# Patient Record
Sex: MALE | Race: WHITE | NOT HISPANIC OR LATINO | Employment: OTHER | URBAN - METROPOLITAN AREA
[De-identification: names, ages, dates, MRNs, and addresses within clinical notes are randomized per-mention and may not be internally consistent; named-entity substitution may affect disease eponyms.]

---

## 2017-01-25 ENCOUNTER — OFFICE VISIT (OUTPATIENT)
Dept: FAMILY MEDICINE CLINIC | Facility: CLINIC | Age: 27
End: 2017-01-25

## 2017-01-25 VITALS
SYSTOLIC BLOOD PRESSURE: 122 MMHG | OXYGEN SATURATION: 95 % | DIASTOLIC BLOOD PRESSURE: 88 MMHG | WEIGHT: 238 LBS | BODY MASS INDEX: 29.59 KG/M2 | HEIGHT: 75 IN | HEART RATE: 81 BPM

## 2017-01-25 DIAGNOSIS — H60.543 DERMATITIS OF BOTH EAR CANALS: ICD-10-CM

## 2017-01-25 DIAGNOSIS — J30.1 SEASONAL ALLERGIC RHINITIS DUE TO POLLEN: ICD-10-CM

## 2017-01-25 DIAGNOSIS — E55.9 VITAMIN D DEFICIENCY: ICD-10-CM

## 2017-01-25 DIAGNOSIS — L30.9 ECZEMA, UNSPECIFIED TYPE: Primary | ICD-10-CM

## 2017-01-25 DIAGNOSIS — F84.0 AUTISM: ICD-10-CM

## 2017-01-25 DIAGNOSIS — K64.0 FIRST DEGREE HEMORRHOIDS: ICD-10-CM

## 2017-01-25 LAB
25(OH)D3 SERPL-MCNC: 30.3 NG/ML
ALBUMIN SERPL-MCNC: 4.5 G/DL (ref 3.2–4.8)
ALBUMIN/GLOB SERPL: 1.7 G/DL (ref 1.5–2.5)
ALP SERPL-CCNC: 94 U/L (ref 25–100)
ALT SERPL W P-5'-P-CCNC: 25 U/L (ref 7–40)
ANION GAP SERPL CALCULATED.3IONS-SCNC: 7 MMOL/L (ref 3–11)
AST SERPL-CCNC: 21 U/L (ref 0–33)
BASOPHILS # BLD AUTO: 0.05 10*3/MM3 (ref 0–0.2)
BASOPHILS NFR BLD AUTO: 0.6 % (ref 0–1)
BILIRUB SERPL-MCNC: 0.4 MG/DL (ref 0.3–1.2)
BUN BLD-MCNC: 16 MG/DL (ref 9–23)
BUN/CREAT SERPL: 22.9 (ref 7–25)
CALCIUM SPEC-SCNC: 9.7 MG/DL (ref 8.7–10.4)
CHLORIDE SERPL-SCNC: 107 MMOL/L (ref 99–109)
CO2 SERPL-SCNC: 27 MMOL/L (ref 20–31)
CREAT BLD-MCNC: 0.7 MG/DL (ref 0.6–1.3)
DEPRECATED RDW RBC AUTO: 41.2 FL (ref 37–54)
EOSINOPHIL # BLD AUTO: 0.25 10*3/MM3 (ref 0.1–0.3)
EOSINOPHIL NFR BLD AUTO: 3.2 % (ref 0–3)
ERYTHROCYTE [DISTWIDTH] IN BLOOD BY AUTOMATED COUNT: 12.9 % (ref 11.3–14.5)
GFR SERPL CREATININE-BSD FRML MDRD: 139 ML/MIN/1.73
GLOBULIN UR ELPH-MCNC: 2.7 GM/DL
GLUCOSE BLD-MCNC: 93 MG/DL (ref 70–100)
HBA1C MFR BLD: 5.1 % (ref 4.8–5.6)
HCT VFR BLD AUTO: 44 % (ref 38.9–50.9)
HGB BLD-MCNC: 14.7 G/DL (ref 13.1–17.5)
IMM GRANULOCYTES # BLD: 0.04 10*3/MM3 (ref 0–0.03)
IMM GRANULOCYTES NFR BLD: 0.5 % (ref 0–0.6)
LYMPHOCYTES # BLD AUTO: 2.26 10*3/MM3 (ref 0.6–4.8)
LYMPHOCYTES NFR BLD AUTO: 29.1 % (ref 24–44)
MCH RBC QN AUTO: 29 PG (ref 27–31)
MCHC RBC AUTO-ENTMCNC: 33.4 G/DL (ref 32–36)
MCV RBC AUTO: 86.8 FL (ref 80–99)
MONOCYTES # BLD AUTO: 0.55 10*3/MM3 (ref 0–1)
MONOCYTES NFR BLD AUTO: 7.1 % (ref 0–12)
NEUTROPHILS # BLD AUTO: 4.62 10*3/MM3 (ref 1.5–8.3)
NEUTROPHILS NFR BLD AUTO: 59.5 % (ref 41–71)
PLATELET # BLD AUTO: 233 10*3/MM3 (ref 150–450)
PMV BLD AUTO: 11.1 FL (ref 6–12)
POTASSIUM BLD-SCNC: 4.3 MMOL/L (ref 3.5–5.5)
PROT SERPL-MCNC: 7.2 G/DL (ref 5.7–8.2)
RBC # BLD AUTO: 5.07 10*6/MM3 (ref 4.2–5.76)
SODIUM BLD-SCNC: 141 MMOL/L (ref 132–146)
TSH SERPL DL<=0.05 MIU/L-ACNC: 1.59 MIU/ML (ref 0.35–5.35)
WBC NRBC COR # BLD: 7.77 10*3/MM3 (ref 3.5–10.8)

## 2017-01-25 PROCEDURE — 99204 OFFICE O/P NEW MOD 45 MIN: CPT | Performed by: FAMILY MEDICINE

## 2017-01-25 PROCEDURE — 36415 COLL VENOUS BLD VENIPUNCTURE: CPT | Performed by: FAMILY MEDICINE

## 2017-01-25 PROCEDURE — 83036 HEMOGLOBIN GLYCOSYLATED A1C: CPT | Performed by: FAMILY MEDICINE

## 2017-01-25 PROCEDURE — 84443 ASSAY THYROID STIM HORMONE: CPT | Performed by: FAMILY MEDICINE

## 2017-01-25 PROCEDURE — 82306 VITAMIN D 25 HYDROXY: CPT | Performed by: FAMILY MEDICINE

## 2017-01-25 PROCEDURE — 85025 COMPLETE CBC W/AUTO DIFF WBC: CPT | Performed by: FAMILY MEDICINE

## 2017-01-25 PROCEDURE — 80053 COMPREHEN METABOLIC PANEL: CPT | Performed by: FAMILY MEDICINE

## 2017-01-25 RX ORDER — TRIAMCINOLONE ACETONIDE 1 MG/G
CREAM TOPICAL 2 TIMES DAILY
Qty: 453.6 G | Refills: 2 | Status: SHIPPED | OUTPATIENT
Start: 2017-01-25

## 2017-01-25 RX ORDER — ASCORBIC ACID 500 MG
1000 TABLET ORAL DAILY
COMMUNITY

## 2017-01-25 RX ORDER — CHOLECALCIFEROL (VITAMIN D3) 25 MCG
TABLET,CHEWABLE ORAL
COMMUNITY
End: 2020-02-04

## 2017-01-25 RX ORDER — FLUTICASONE PROPIONATE 50 MCG
2 SPRAY, SUSPENSION (ML) NASAL DAILY
Qty: 1 EACH | Refills: 6 | Status: SHIPPED | OUTPATIENT
Start: 2017-01-25 | End: 2018-02-09 | Stop reason: SDUPTHER

## 2017-01-25 RX ORDER — MOMETASONE FUROATE 1 MG/G
OINTMENT TOPICAL DAILY
COMMUNITY
End: 2017-01-25 | Stop reason: SDUPTHER

## 2017-01-25 RX ORDER — MOMETASONE FUROATE 1 MG/G
OINTMENT TOPICAL DAILY
Qty: 15 G | Refills: 3 | Status: SHIPPED | OUTPATIENT
Start: 2017-01-25 | End: 2018-12-07 | Stop reason: SDUPTHER

## 2017-01-25 RX ORDER — FLUTICASONE PROPIONATE 50 MCG
2 SPRAY, SUSPENSION (ML) NASAL DAILY
COMMUNITY
End: 2017-01-25 | Stop reason: SDUPTHER

## 2017-01-25 RX ORDER — TRIAMCINOLONE ACETONIDE 1 MG/G
CREAM TOPICAL 2 TIMES DAILY
COMMUNITY
End: 2017-01-25 | Stop reason: SDUPTHER

## 2017-01-25 RX ORDER — MELATONIN
2000 DAILY
COMMUNITY

## 2017-01-25 NOTE — PROGRESS NOTES
"Subjective   Julius Romero is a 26 y.o. male. Est care    History of Present Illness Prior pcp rudy patino with .  Last visit November.      Pt is present with mother today. Pt is autistic and mother is power of . PMH rectal bleeding due to hemorrhoids eval with c-scope at  spring 2016.    He also has hx of eczema and psoriasis.  He has been evaluated by derm in past.       He has hx with wax in ears.      He has been eval with [facility] due to intoeing of feet with ambulation.      Mother requests referral for eye exam due to holding left hand over left eye and pushing his eye to focus. On occasion he gets HA that is alleviated with tylenol or ibu.      Pt mother would like to discuss pt having vit d levels checked \"he takes vitamin D and can get toxic\"  Diagnosed with deficiency 5 years ago.  1000 IU daily.  He was seeing dr Evans for a while.  Last Level 36.  Mother reports she has been checked 2 times a year.       Mother would like to discuss the RX pt has and wants to know if doctor can write those or be sent off to dermatology. Dry skin on right lateral ankle and to lateral calf.  It does itch.  Comes and goes.  Uses dove soap.     Pt has sore on lower right leg that has been presnt about 2 days that comes and goes, mother was told it was eczema and would like it looked at.    The following portions of the patient's history were reviewed and updated as appropriate: allergies, current medications, past family history, past medical history, past social history, past surgical history and problem list.    Review of Systems   Constitutional: Negative.    HENT: Negative.    Eyes: Negative.    Respiratory: Negative.    Cardiovascular: Negative.    Gastrointestinal: Negative.    Endocrine: Negative.    Genitourinary: Negative.    Musculoskeletal: Negative.    Skin: Positive for rash.   Neurological: Negative.    Hematological: Negative.        Objective     Vitals:    01/25/17 1437   BP: 122/88 " "  Pulse: 81   SpO2: 95%   Weight: 238 lb (108 kg)   Height: 75\" (190.5 cm)       Physical Exam   Constitutional: He is oriented to person, place, and time. He appears well-developed and well-nourished.   HENT:   Head: Normocephalic and atraumatic.   Right Ear: External ear normal.   Left Ear: External ear normal.   Nose: Nose normal.   Mouth/Throat: Oropharynx is clear and moist.   Eyes: Conjunctivae and EOM are normal. Pupils are equal, round, and reactive to light. Right eye exhibits no discharge. Left eye exhibits no discharge. No scleral icterus.   Neck: Normal range of motion. Neck supple. No JVD present. No tracheal deviation present. No thyromegaly present.   Cardiovascular: Normal rate, regular rhythm and normal heart sounds.  Exam reveals no gallop and no friction rub.    No murmur heard.  Pulmonary/Chest: Effort normal and breath sounds normal. No respiratory distress. He has no wheezes. He has no rales. He exhibits no tenderness.   Abdominal: Soft. Bowel sounds are normal. He exhibits no distension and no mass. There is no tenderness.   Musculoskeletal: Normal range of motion. He exhibits no edema.   Neurological: He is alert and oriented to person, place, and time. He has normal reflexes. He displays normal reflexes. No cranial nerve deficit. Coordination normal.   Skin: Skin is warm and dry.   Psychiatric: He has a normal mood and affect. His behavior is normal. Judgment and thought content normal.   Nursing note and vitals reviewed.      Assessment/Plan     Problem List Items Addressed This Visit        Cardiovascular and Mediastinum    First degree hemorrhoids       Digestive    Vitamin D deficiency    Relevant Orders    Vitamin D 25 Hydroxy       Nervous and Auditory    Dermatitis of both ear canals    Relevant Medications    mometasone (ELOCON) 0.1 % ointment    triamcinolone (KENALOG) 0.1 % cream       Musculoskeletal and Integument    Eczema - Primary    Relevant Medications    mometasone (ELOCON) " 0.1 % ointment    triamcinolone (KENALOG) 0.1 % cream    Other Relevant Orders    CBC & Differential    TSH    Comprehensive Metabolic Panel    Hemoglobin A1c       Other    Autism    Seasonal allergic rhinitis due to pollen        Will send for a copy of prior medical records.  Patient will sign a release as He is leaving the office today.  I will review those upon receipt.

## 2017-01-25 NOTE — MR AVS SNAPSHOT
Julius Romero   1/25/2017 2:30 PM   Office Visit    Provider:  Lizz Kumar MD   Department:  [facility]   Dept Phone:  [phone]                Your Full Care Plan              Your Updated Medication List          This list is accurate as of: 1/25/17  3:31 PM.  Always use your most recent med list.                acetaminophen 100 MG/ML solution   Commonly known as:  TYLENOL       B-12 1000 MCG capsule       cholecalciferol 1000 UNITS tablet   Commonly known as:  VITAMIN D3       Fish Oil oil       fluticasone 50 MCG/ACT nasal spray   Commonly known as:  FLONASE       mometasone 0.1 % ointment   Commonly known as:  ELOCON       MULTI VITAMIN DAILY PO       PRO-BIOTIC BLEND PO       PROCTOSOL HC 2.5 % rectal cream   Generic drug:  hydrocortisone       psyllium 58.6 % packet   Commonly known as:  METAMUCIL       SUPER B-COMPLEX/VIT C/FA PO       triamcinolone 0.1 % cream   Commonly known as:  KENALOG       vitamin C 500 MG tablet   Commonly known as:  ASCORBIC ACID               We Performed the Following     CBC & Differential     CBC Auto Differential     Comprehensive Metabolic Panel     Hemoglobin A1c     TSH     Vitamin D 25 Hydroxy       You Were Diagnosed With        Codes Comments    Eczema, unspecified type    -  Primary ICD-10-CM: L30.9  ICD-9-CM: 692.9     Vitamin D deficiency     ICD-10-CM: E55.9  ICD-9-CM: 268.9     Autism     ICD-10-CM: F84.0  ICD-9-CM: 299.00     Seasonal allergic rhinitis due to pollen     ICD-10-CM: J30.1  ICD-9-CM: 477.0     First degree hemorrhoids     ICD-10-CM: K64.0  ICD-9-CM: 455.6     Dermatitis of both ear canals     ICD-10-CM: H60.543  ICD-9-CM: 380.22       Instructions     None    Patient Instructions History      MyChart Signup     [facility] [employer] allows you to send messages to your doctor, view your test results, renew your prescriptions, schedule appointments, and more. To sign up, go to  "[employer] and click on the Sign Up Now link in the New User? box. Enter your [employer] Activation Code exactly as it appears below along with the last four digits of your Social Security Number and your Date of Birth () to complete the sign-up process. If you do not sign up before the expiration date, you must request a new code.    [employer] Activation Code: [email or web address]  Expires: 2017  3:30 PM    If you have questions, you can email [email or web address] or call [phone] to talk to our [employer] staff. Remember, [employer] is NOT to be used for urgent needs. For medical emergencies, dial 911.               Other Info from Your Visit           Your Appointments     2017  2:30 PM EDT   Follow Up with Lizz Kumar MD   [facility] (--)    [address] [place] [address] [address]  [facility] [address]   [phone]           Arrive 15 minutes prior to appointment.              Allergies     No Known Allergies      Vital Signs     Blood Pressure Pulse Height Weight Oxygen Saturation Body Mass Index    122/88 81 75\" (190.5 cm) 238 lb (108 kg) 95% 29.75 kg/m2    Smoking Status                   Never Smoker           Problems and Diagnoses Noted     Autism    Dermatitis of both ear canals    Eczema    First degree hemorrhoids    Seasonal allergic rhinitis due to pollen    Vitamin D deficiency      Results       "

## 2017-08-08 ENCOUNTER — OFFICE VISIT (OUTPATIENT)
Dept: FAMILY MEDICINE CLINIC | Facility: CLINIC | Age: 27
End: 2017-08-08

## 2017-08-08 VITALS
HEART RATE: 81 BPM | HEIGHT: 75 IN | SYSTOLIC BLOOD PRESSURE: 122 MMHG | WEIGHT: 233 LBS | BODY MASS INDEX: 28.97 KG/M2 | DIASTOLIC BLOOD PRESSURE: 78 MMHG | OXYGEN SATURATION: 95 %

## 2017-08-08 DIAGNOSIS — J30.1 SEASONAL ALLERGIC RHINITIS DUE TO POLLEN: ICD-10-CM

## 2017-08-08 DIAGNOSIS — R06.83 SNORING: Primary | ICD-10-CM

## 2017-08-08 DIAGNOSIS — R40.0 DAYTIME SOMNOLENCE: ICD-10-CM

## 2017-08-08 PROCEDURE — 99214 OFFICE O/P EST MOD 30 MIN: CPT | Performed by: FAMILY MEDICINE

## 2017-08-08 NOTE — PATIENT INSTRUCTIONS
Tdap Vaccine (Tetanus, Diphtheria and Pertussis): What You Need to Know  1. Why get vaccinated?  Tetanus, diphtheria and pertussis are very serious diseases. Tdap vaccine can protect us from these diseases. And, Tdap vaccine given to pregnant women can protect  babies against pertussis.  TETANUS (Lockjaw) is rare in the [place] today. It causes painful muscle tightening and stiffness, usually all over the body.  · It can lead to tightening of muscles in the head and neck so you can't open your mouth, swallow, or sometimes even breathe. Tetanus kills about 1 out of 10 people who are infected even after receiving the best medical care.  DIPHTHERIA is also rare in the [place] today. It can cause a thick coating to form in the back of the throat.  · It can lead to breathing problems, heart failure, paralysis, and death.  PERTUSSIS (Whooping Cough) causes severe coughing spells, which can cause difficulty breathing, vomiting and disturbed sleep.  · It can also lead to weight loss, incontinence, and rib fractures. Up to 2 in 100 adolescents and 5 in 100 adults with pertussis are hospitalized or have complications, which could include pneumonia or death.  These diseases are caused by bacteria. Diphtheria and pertussis are spread from person to person through secretions from coughing or sneezing. Tetanus enters the body through cuts, scratches, or wounds.  Before vaccines, as many as 200,000 cases of diphtheria, 200,000 cases of pertussis, and hundreds of cases of tetanus, were reported in the [place] each year. Since vaccination began, reports of cases for tetanus and diphtheria have dropped by about 99% and for pertussis by about 80%.  2. Tdap vaccine  Tdap vaccine can protect adolescents and adults from tetanus, diphtheria, and pertussis. One dose of Tdap is routinely given at age 11 or 12. People who did not get Tdap at that age should get it as soon as possible.  Tdap is especially important  for healthcare professionals and anyone having close contact with a baby younger than 12 months.  Pregnant women should get a dose of Tdap during every pregnancy, to protect the  from pertussis. Infants are most at risk for severe, life-threatening complications from pertussis.  Another vaccine, called Td, protects against tetanus and diphtheria, but not pertussis. A Td booster should be given every 10 years. Tdap may be given as one of these boosters if you have never gotten Tdap before. Tdap may also be given after a severe cut or burn to prevent tetanus infection.  Your doctor or the person giving you the vaccine can give you more information.  Tdap may safely be given at the same time as other vaccines.  3. Some people should not get this vaccine  · A person who has ever had a life-threatening allergic reaction after a previous dose of any diphtheria, tetanus or pertussis containing vaccine, OR has a severe allergy to any part of this vaccine, should not get Tdap vaccine. Tell the person giving the vaccine about any severe allergies.  · Anyone who had coma or long repeated seizures within 7 days after a childhood dose of DTP or DTaP, or a previous dose of Tdap, should not get Tdap, unless a cause other than the vaccine was found. They can still get Td.  · Talk to your doctor if you:    have seizures or another nervous system problem,    had severe pain or swelling after any vaccine containing diphtheria, tetanus or pertussis,    ever had a condition called Guillain-Barré Syndrome (GBS),    aren't feeling well on the day the shot is scheduled.  4. Risks  With any medicine, including vaccines, there is a chance of side effects. These are usually mild and go away on their own. Serious reactions are also possible but are rare.  Most people who get Tdap vaccine do not have any problems with it.  Mild problems following Tdap  (Did not interfere with activities)  · Pain where the shot was given (about 3 in 4  adolescents or 2 in 3 adults)  · Redness or swelling where the shot was given (about 1 person in 5)  · Mild fever of at least 100.4°F (up to about 1 in 25 adolescents or 1 in 100 adults)  · Headache (about 3 or 4 people in 10)  · Tiredness (about 1 person in 3 or 4)  · Nausea, vomiting, diarrhea, stomach ache (up to 1 in 4 adolescents or 1 in 10 adults)  · Chills, sore joints (about 1 person in 10)  · Body aches (about 1 person in 3 or 4)  · Rash, swollen glands (uncommon)  Moderate problems following Tdap  (Interfered with activities, but did not require medical attention)  · Pain where the shot was given (up to 1 in 5 or 6)  · Redness or swelling where the shot was given (up to about 1 in 16 adolescents or 1 in 12 adults)  · Fever over 102°F (about 1 in 100 adolescents or 1 in 250 adults)  · Headache (about 1 in 7 adolescents or 1 in 10 adults)  · Nausea, vomiting, diarrhea, stomach ache (up to 1 or 3 people in 100)  · Swelling of the entire arm where the shot was given (up to about 1 in 500).  Severe problems following Tdap  (Unable to perform usual activities; required medical attention)  · Swelling, severe pain, bleeding and redness in the arm where the shot was given (rare).  Problems that could happen after any vaccine:  · People sometimes faint after a medical procedure, including vaccination. Sitting or lying down for about 15 minutes can help prevent fainting, and injuries caused by a fall. Tell your doctor if you feel dizzy, or have vision changes or ringing in the ears.  · Some people get severe pain in the shoulder and have difficulty moving the arm where a shot was given. This happens very rarely.  · Any medication can cause a severe allergic reaction. Such reactions from a vaccine are very rare, estimated at fewer than 1 in a million doses, and would happen within a few minutes to a few hours after the vaccination.  As with any medicine, there is a very remote chance of a vaccine causing a serious  injury or death.  The safety of vaccines is always being monitored. For more information, visit: www.cdc.gov/vaccinesafety/  5. What if there is a serious problem?  What should I look for?  · Look for anything that concerns you, such as signs of a severe allergic reaction, very high fever, or unusual behavior.  · Signs of a severe allergic reaction can include hives, swelling of the face and throat, difficulty breathing, a fast heartbeat, dizziness, and weakness. These would usually start a few minutes to a few hours after the vaccination.  What should I do?  · If you think it is a severe allergic reaction or other emergency that can't wait, call 9-1-1 or get the person to the nearest hospital. Otherwise, call your doctor.  · Afterward, the reaction should be reported to the Vaccine Adverse Event Reporting System (VAERS). Your doctor might file this report, or you can do it yourself through the VAERS web site at www.vaers.hhs.gov, or by calling 1-680.840.1811.  VAERS does not give medical advice.   6. The National Vaccine Injury Compensation Program  The National Vaccine Injury Compensation Program (VICP) is a federal program that was created to compensate people who may have been injured by certain vaccines.  Persons who believe they may have been injured by a vaccine can learn about the program and about filing a claim by calling 1-247.497.4018 or visiting the VICP website at www.hrsa.gov/vaccinecompensation. There is a time limit to file a claim for compensation.  7. How can I learn more?  · Ask your doctor. He or she can give you the vaccine package insert or suggest other sources of information.  · Call your local or state health department.  · Contact the Centers for Disease Control and Prevention (CDC):    Call 1-383.453.7519 (6-408-YVW-INFO) or    Visit CDC's website at www.cdc.gov/vaccines  CDC Tdap Vaccine VIS (2/24/15)     This information is not intended to replace advice given to you by your health care  provider. Make sure you discuss any questions you have with your health care provider.     Document Released: 06/18/2013 Document Revised: 01/08/2016 Document Reviewed: 04/01/2015  Elsevier Interactive Patient Education ©2017 Elsevier Inc.

## 2017-08-08 NOTE — PROGRESS NOTES
Subjective   Julius Romero is a 27 y.o. male.  Pt is here for 6 month fu. Pt is present with mother today. Mom is concerned about pt complaining of having headaches     History of Present Illness He is here for 6 month follow up.  Continues to walk at mall with father daily.      Mother expresses multiple concerns today.  She is worried about his height and finger length.  She is also concerned that many children with autism have cardiac defects and is interested in having a cardiac work up.  He had echo at one point for workup for marfan's syndrome.  That workup in past was negative.      In past he has had headaches and was seen by neurology.  She reports an MRI was not ordered.  Headaches 2 times weekly in mornings worse.  No focal neurologic changes no numbness or tingling headaches are transient and not persistent.    He does snore at night on occasion and seems more tired in afternoons.  The mother reports that he has had some postnasal drainage and allergy type symptoms in the past few days.  He has not had a fever.  She reports that he's been propping himself up on a pillow at night but no shortness of breath otherwise.  She is concerned he may have a sore throat and would like me to look in his ears because sometimes he gets wax build up in his ears.    I have reviewed previous records from 2016 from .  He was seen and evaluated in past by colorectal at .       The following portions of the patient's history were reviewed and updated as appropriate: allergies, current medications, past family history, past medical history, past social history, past surgical history and problem list.    Review of Systems   Constitutional: Negative for chills, fatigue, fever and unexpected weight change.   HENT: Negative for congestion, ear pain, nosebleeds, rhinorrhea, sinus pressure, sneezing, sore throat and trouble swallowing.    Eyes: Negative for itching and visual disturbance.   Respiratory: Negative for cough,  "chest tightness, shortness of breath and wheezing.    Cardiovascular: Negative for chest pain, palpitations and leg swelling.   Gastrointestinal: Negative for abdominal pain, anal bleeding, blood in stool, constipation, diarrhea, nausea and vomiting.   Endocrine: Negative for cold intolerance, heat intolerance, polydipsia and polyuria.   Genitourinary: Negative for difficulty urinating, frequency, hematuria and urgency.   Musculoskeletal: Negative for back pain, gait problem and myalgias.   Skin: Negative for rash and wound.   Neurological: Negative for dizziness, weakness, numbness and headaches.   Hematological: Negative for adenopathy. Does not bruise/bleed easily.   Psychiatric/Behavioral: Negative for agitation, confusion, decreased concentration and suicidal ideas. The patient is not nervous/anxious.        Objective     Vitals:    08/08/17 1526   BP: 122/78   Pulse: 81   SpO2: 95%   Weight: 233 lb (106 kg)   Height: 75\" (190.5 cm)       Physical Exam   Constitutional: He is oriented to person, place, and time. He appears well-developed and well-nourished.   HENT:   Head: Normocephalic and atraumatic.   Right Ear: External ear normal.   Left Ear: External ear normal.   Nose: Nose normal.   Mouth/Throat: Oropharynx is clear and moist. No oropharyngeal exudate.   Eyes: Conjunctivae and EOM are normal. Pupils are equal, round, and reactive to light. Right eye exhibits no discharge. Left eye exhibits no discharge. No scleral icterus.   Neck: Normal range of motion. Neck supple. No JVD present. No tracheal deviation present. No thyromegaly present.   Cardiovascular: Normal rate, regular rhythm and normal heart sounds.  Exam reveals no gallop and no friction rub.    No murmur heard.  Pulmonary/Chest: Effort normal and breath sounds normal. No respiratory distress. He has no wheezes. He has no rales. He exhibits no tenderness.   Abdominal: Soft. Bowel sounds are normal. He exhibits no distension and no mass. There is " no tenderness.   Musculoskeletal: Normal range of motion. He exhibits no edema.   Neurological: He is alert and oriented to person, place, and time. He has normal reflexes. He displays normal reflexes. No cranial nerve deficit. Coordination normal.   Skin: Skin is warm and dry.   Psychiatric: He has a normal mood and affect. His behavior is normal. Judgment and thought content normal.   Nursing note and vitals reviewed.      Assessment/Plan     Problem List Items Addressed This Visit        Respiratory    Snoring - Primary    Relevant Orders    Ambulatory Referral to Sleep Medicine       Other    Seasonal allergic rhinitis due to pollen    Daytime somnolence      Start treatment for seasonal allergies with Allegra or Claritin.    If he tolerates we could try Flonase.  We will go ahead and refer for a sleep study with sleep disorders evaluation to make sure he doesn't have some sleep apnea contributing to both his daytime somnolence and his headaches.    I've reassured and discussed with the mother that he does not appear to have a marfanoid stature.  He has had an echo in the past for evaluation and workup.    Recommended a T dap today the mother would like to make sure that there are no additives that could contribute to be associated with his autism.  I've given her information on the tetanus vaccine.

## 2017-12-15 ENCOUNTER — CONSULT (OUTPATIENT)
Dept: SLEEP MEDICINE | Facility: HOSPITAL | Age: 27
End: 2017-12-15

## 2017-12-15 VITALS
HEART RATE: 69 BPM | OXYGEN SATURATION: 98 % | DIASTOLIC BLOOD PRESSURE: 68 MMHG | SYSTOLIC BLOOD PRESSURE: 126 MMHG | HEIGHT: 75 IN | BODY MASS INDEX: 27.48 KG/M2 | WEIGHT: 221 LBS

## 2017-12-15 DIAGNOSIS — E66.3 OVERWEIGHT: ICD-10-CM

## 2017-12-15 DIAGNOSIS — G47.33 OBSTRUCTIVE SLEEP APNEA, ADULT: ICD-10-CM

## 2017-12-15 DIAGNOSIS — R06.83 SNORING: Primary | ICD-10-CM

## 2017-12-15 PROCEDURE — 99203 OFFICE O/P NEW LOW 30 MIN: CPT | Performed by: INTERNAL MEDICINE

## 2017-12-15 NOTE — PATIENT INSTRUCTIONS
Sleep Apnea  Sleep apnea is a condition in which breathing pauses or becomes shallow during sleep. Episodes of sleep apnea usually last 10 seconds or longer, and they may occur as many as 20 times an hour. Sleep apnea disrupts your sleep and keeps your body from getting the rest that it needs. This condition can increase your risk of certain health problems, including:  · Heart attack.  · Stroke.  · Obesity.  · Diabetes.  · Heart failure.  · Irregular heartbeat.  There are three kinds of sleep apnea:  · Obstructive sleep apnea. This kind is caused by a blocked or collapsed airway.  · Central sleep apnea. This kind happens when the part of the brain that controls breathing does not send the correct signals to the muscles that control breathing.  · Mixed sleep apnea. This is a combination of obstructive and central sleep apnea.  CAUSES  The most common cause of this condition is a collapsed or blocked airway. An airway can collapse or become blocked if:  · Your throat muscles are abnormally relaxed.  · Your tongue and tonsils are larger than normal.  · You are overweight.  · Your airway is smaller than normal.  RISK FACTORS  This condition is more likely to develop in people who:  · Are overweight.  · Smoke.  · Have a smaller than normal airway.  · Are elderly.  · Are male.  · Drink alcohol.  · Take sedatives or tranquilizers.  · Have a family history of sleep apnea.  SYMPTOMS  Symptoms of this condition include:  · Trouble staying asleep.  · Daytime sleepiness and tiredness.  · Irritability.  · Loud snoring.  · Morning headaches.  · Trouble concentrating.  · Forgetfulness.  · Decreased interest in sex.  · Unexplained sleepiness.  · Mood swings.  · Personality changes.  · Feelings of depression.  · Waking up often during the night to urinate.  · Dry mouth.  · Sore throat.  DIAGNOSIS  This condition may be diagnosed with:  · A medical history.  · A physical exam.  · A series of tests that are done while you are  sleeping (sleep study). These tests are usually done in a sleep lab, but they may also be done at home.  TREATMENT  Treatment for this condition aims to restore normal breathing and to ease symptoms during sleep. It may involve managing health issues that can affect breathing, such as high blood pressure or obesity. Treatment may include:  · Sleeping on your side.  · Using a decongestant if you have nasal congestion.  · Avoiding the use of depressants, including alcohol, sedatives, and narcotics.  · Losing weight if you are overweight.  · Making changes to your diet.  · Quitting smoking.  · Using a device to open your airway while you sleep, such as:    An oral appliance. This is a custom-made mouthpiece that shifts your lower jaw forward.    A continuous positive airway pressure (CPAP) device. This device delivers oxygen to your airway through a mask.    A nasal expiratory positive airway pressure (EPAP) device. This device has valves that you put into each nostril.    A bi-level positive airway pressure (BPAP) device. This device delivers oxygen to your airway through a mask.  · Surgery if other treatments do not work. During surgery, excess tissue is removed to create a wider airway.  It is important to get treatment for sleep apnea. Without treatment, this condition can lead to:  · High blood pressure.  · Coronary artery disease.  · (Men) An inability to achieve or maintain an erection (impotence).  · Reduced thinking abilities.  HOME CARE INSTRUCTIONS  · Make any lifestyle changes that your health care provider recommends.  · Eat a healthy, well-balanced diet.  · Take over-the-counter and prescription medicines only as told by your health care provider.  · Avoid using depressants, including alcohol, sedatives, and narcotics.  · Take steps to lose weight if you are overweight.  · If you were given a device to open your airway while you sleep, use it only as told by your health care provider.  · Do not use any  tobacco products, such as cigarettes, chewing tobacco, and e-cigarettes. If you need help quitting, ask your health care provider.  · Keep all follow-up visits as told by your health care provider. This is important.  SEEK MEDICAL CARE IF:  · The device that you received to open your airway during sleep is uncomfortable or does not seem to be working.  · Your symptoms do not improve.  · Your symptoms get worse.  SEEK IMMEDIATE MEDICAL CARE IF:  · You develop chest pain.  · You develop shortness of breath.  · You develop discomfort in your back, arms, or stomach.  · You have trouble speaking.  · You have weakness on one side of your body.  · You have drooping in your face.  These symptoms may represent a serious problem that is an emergency. Do not wait to see if the symptoms will go away. Get medical help right away. Call your local emergency services (911 in the [place] Do not drive yourself to the hospital.     This information is not intended to replace advice given to you by your health care provider. Make sure you discuss any questions you have with your health care provider.     Document Released: 12/08/2003 Document Revised: 04/10/2017 Document Reviewed: 09/26/2016  [employer] Interactive Patient Education ©2017 [employer]

## 2017-12-17 NOTE — PROGRESS NOTES
Subjective   Julius Romero is a 27 y.o. male is being seen for consultation today at the request of Lizz Kumar MD for the evaluation of nonrestorative sleep.    History of Present Illness  Patient has significant autism and the history is obtained from his mother.  He apparently awakens complaining of headache couple of days per week.  He falls asleep quickly in the evenings.  He is not been noted to have snoring are noted gasping for breath.  He has no history of breaking his nose.  He does have trouble breathing through his nose both day and night.    There is no history of reflux symptoms and no history of hypnagogic hallucinations sleep paralysis or cataplexy.  There is no history of kicking or jerking his legs at night.    He goes to bed mean 9:10 PM.  He will fall asleep fairly quickly.  He does not usually awaken during the night.  He gets about 9 hours of sleep apparently feels fairly rested.  There is no history of hypertension diabetes or coronary artery disease.  No Known Allergies he does have environmental allergies.      Current Outpatient Prescriptions:   •  acetaminophen (TYLENOL) 100 MG/ML solution, Take 10 mg/kg by mouth Every 4 (Four) Hours As Needed for fever., Disp: , Rfl:   •  B Complex-C-Folic Acid (SUPER B-COMPLEX/VIT C/FA PO), Take  by mouth., Disp: , Rfl:   •  cholecalciferol (VITAMIN D3) 1000 UNITS tablet, Take 1,000 Units by mouth Daily., Disp: , Rfl:   •  Cyanocobalamin (B-12) 1000 MCG capsule, Take  by mouth., Disp: , Rfl:   •  docusate sodium (COLACE) 50 MG capsule, Take  by mouth 2 (Two) Times a Day As Needed for Constipation., Disp: , Rfl:   •  Fish Oil oil, , Disp: , Rfl:   •  fluticasone (FLONASE) 50 MCG/ACT nasal spray, 2 sprays into each nostril Daily., Disp: 1 each, Rfl: 6  •  hydrocortisone (PROCTOSOL HC) 2.5 % rectal cream, Insert  into the rectum 2 (Two) Times a Day., Disp: 28.35 g, Rfl: 3  •  mometasone (ELOCON) 0.1 % ointment, Apply  topically Daily., Disp: 15 g,  Rfl: 3  •  Multiple Vitamin (MULTI VITAMIN DAILY PO), Take  by mouth., Disp: , Rfl:   •  Probiotic Product (PRO-BIOTIC BLEND PO), Take  by mouth., Disp: , Rfl:   •  psyllium (METAMUCIL) 58.6 % packet, Take 1 packet by mouth Daily., Disp: , Rfl:   •  triamcinolone (KENALOG) 0.1 % cream, Apply  topically 2 (Two) Times a Day., Disp: 453.6 g, Rfl: 2  •  vitamin C (ASCORBIC ACID) 500 MG tablet, Take 1,000 mg by mouth Daily., Disp: , Rfl:     Smoking status: Never Smoker                                                              Smokeless status: Never Used                           History   Alcohol Use No       Caffeine: He has one serving of tea per day    Past Medical History:   Diagnosis Date   • Autism    • Constipation    • Eczema    • Excess ear wax    • Visual impairment    • Vitamin D deficiency        Past Surgical History:   Procedure Laterality Date   • COLONOSCOPY     • MOUTH SURGERY         Family History   Problem Relation Age of Onset   • Diabetes Mother    • Hypertension Mother    • Vision loss Mother    • Hyperlipidemia Mother    • Arthritis Father    • Depression Father    • Parkinsonism Father    • Hyperlipidemia Father        The following portions of the patient's history were reviewed and updated as appropriate: allergies, current medications, past family history, past medical history, past social history, past surgical history and problem list.    Review of Systems   Constitutional: Negative.    HENT: Positive for postnasal drip and sinus pressure.    Eyes: Negative.    Respiratory: Negative.    Cardiovascular: Negative.    Gastrointestinal: Positive for blood in stool.   Endocrine: Negative.    Genitourinary: Negative.    Musculoskeletal: Positive for gait problem.   Skin: Positive for rash.   Allergic/Immunologic: Positive for environmental allergies.   Neurological: Positive for headaches.   Hematological: Negative.    Psychiatric/Behavioral: Negative.         He has significant autism     "Posey scores 3/24    Objective     /68  Pulse 69  Ht 190.5 cm (75\")  Wt 100 kg (221 lb)  SpO2 98%  BMI 27.62 kg/m2     Physical Exam   Constitutional: He is oriented to person, place, and time. He appears well-developed and well-nourished.   He is overweight.   HENT:   Head: Normocephalic and atraumatic.   He has Mallampati class four anatomy   Eyes: EOM are normal. Pupils are equal, round, and reactive to light.   Neck: Normal range of motion. Neck supple.   Cardiovascular: Normal rate, regular rhythm and normal heart sounds.    Pulmonary/Chest: Effort normal and breath sounds normal.   Abdominal: Soft. Bowel sounds are normal.   Musculoskeletal: Normal range of motion. He exhibits no edema.   Neurological: He is alert and oriented to person, place, and time.   Skin: Skin is warm and dry.   Psychiatric: He has a normal mood and affect. His behavior is normal.         Assessment/Plan   Julius was seen today for sleeping problem.    Diagnoses and all orders for this visit:    Snoring  -     Home Sleep Study; Future    Obstructive sleep apnea, adult  -     Home Sleep Study; Future    Overweight     patient's mother gives a history of nonrestorative sleep.  He is rather sleepy during the day.  He is unsure if his awakening at night.  We will plan to proceed to home sleep testing.  I've discussed possible therapies for sleep apnea including CPAP, weight control, oral appliances, and surgery.  He is to return in roughly 2 weeks after his  study and we'll reassess situation         Saurabh Tsang MD [facility]  Sleep Medicine  Pulmonary and Critical Care Medicine    "

## 2017-12-18 ENCOUNTER — HOSPITAL ENCOUNTER (OUTPATIENT)
Dept: SLEEP MEDICINE | Facility: HOSPITAL | Age: 27
Discharge: HOME | End: 2017-12-18
Attending: INTERNAL MEDICINE | Admitting: INTERNAL MEDICINE

## 2017-12-18 VITALS
BODY MASS INDEX: 27.48 KG/M2 | OXYGEN SATURATION: 96 % | WEIGHT: 221 LBS | SYSTOLIC BLOOD PRESSURE: 123 MMHG | HEIGHT: 75 IN | DIASTOLIC BLOOD PRESSURE: 72 MMHG

## 2017-12-18 DIAGNOSIS — R06.83 SNORING: ICD-10-CM

## 2017-12-18 DIAGNOSIS — G47.33 OBSTRUCTIVE SLEEP APNEA, ADULT: ICD-10-CM

## 2017-12-18 PROCEDURE — 95800 SLP STDY UNATTENDED: CPT | Performed by: INTERNAL MEDICINE

## 2017-12-18 PROCEDURE — 95800 SLP STDY UNATTENDED: CPT

## 2018-02-08 ENCOUNTER — OFFICE VISIT (OUTPATIENT)
Dept: FAMILY MEDICINE CLINIC | Facility: CLINIC | Age: 28
End: 2018-02-08

## 2018-02-08 VITALS
SYSTOLIC BLOOD PRESSURE: 104 MMHG | BODY MASS INDEX: 26.75 KG/M2 | DIASTOLIC BLOOD PRESSURE: 70 MMHG | WEIGHT: 214 LBS | TEMPERATURE: 98.2 F | RESPIRATION RATE: 16 BRPM | HEART RATE: 64 BPM | OXYGEN SATURATION: 98 %

## 2018-02-08 DIAGNOSIS — F84.0 AUTISM: ICD-10-CM

## 2018-02-08 DIAGNOSIS — R51.9 HEADACHE ON TOP OF HEAD: Primary | ICD-10-CM

## 2018-02-08 PROCEDURE — 99214 OFFICE O/P EST MOD 30 MIN: CPT | Performed by: FAMILY MEDICINE

## 2018-02-08 NOTE — PROGRESS NOTES
Subjective   Julius Romero is a 27 y.o. male.     History of Present Illness   Sleep difficulties if uninterested.  Home with mother most of the time.  Does not get sleepy when on outing or in shows interest in an activity.  Likes some [employer] programs.  History autism. Has not heard results form the sleep study.  Still headaches mornings. Remote evaluation at [facility] for seizures approximately 15 years ago.  Mother notices he will place hand to left temple and partially covers left eye watching TV.  Does not cover eye when reading.   Overview of the study does not have a recommendation about use of C-pap or breathing assist device.  Mother here and said it would be difficult for Julius to use a fitted device as that would provoke his hyper awareness.  Weight decrease since August.  Has been on probiotics that has helped with bowel movements.  Less hemorrhoid problems.  Exercise every day.  Mother says his sweets are restricted and watches foods closely.   The following portions of the patient's history were reviewed and updated as appropriate: allergies, current medications, past family history, past medical history, past social history, past surgical history and problem list.    Review of Systems   Constitutional: Negative.    HENT: Negative.    Eyes: Negative.    Respiratory: Negative.    Cardiovascular: Negative.    Gastrointestinal: Negative.    Genitourinary: Negative.    Skin: Negative.        Objective   Physical Exam   Constitutional: He appears well-developed. He is cooperative. No distress.   Reduces attention, does not directly communicate,  Cooperative and well behaved.    HENT:   Right Ear: External ear normal.   Left Ear: External ear normal.   Mouth/Throat: Oropharynx is clear and moist.   Eyes: Conjunctivae are normal.   Can not detect nystagmus or abnormal eye motions.  No eye redness.    Neck: Neck supple.   Cardiovascular: Regular rhythm and normal heart sounds.    Pulmonary/Chest:  Effort normal and breath sounds normal.   Lymphadenopathy:     He has no cervical adenopathy.   Neurological: He is alert.   Skin: Skin is warm.   Vitals reviewed.      Assessment/Plan   Julius was seen today for follow-up.    Diagnoses and all orders for this visit:    Headache on top of head  -     CT Head With & Without Contrast    Autism

## 2018-02-09 DIAGNOSIS — J30.1 SEASONAL ALLERGIC RHINITIS DUE TO POLLEN: ICD-10-CM

## 2018-02-09 RX ORDER — FLUTICASONE PROPIONATE 50 MCG
SPRAY, SUSPENSION (ML) NASAL
Qty: 16 G | Refills: 6 | Status: SHIPPED | OUTPATIENT
Start: 2018-02-09 | End: 2019-04-01 | Stop reason: SDUPTHER

## 2018-02-09 NOTE — TELEPHONE ENCOUNTER
----- Message from Elsi Hyatt sent at 2/9/2018 11:22 AM EST -----  Contact: [phone]  Adelaide (mother) called and wants to know why dr georges ordered with and without? She wants to know today.     Went over the reasoning for the way test was ordered with pt's mother.  BBlizbeth, CMA

## 2018-02-12 ENCOUNTER — HOSPITAL ENCOUNTER (OUTPATIENT)
Dept: CT IMAGING | Facility: HOSPITAL | Age: 28
Discharge: HOME | End: 2018-02-12
Admitting: FAMILY MEDICINE

## 2018-02-12 PROCEDURE — 70450 CT HEAD/BRAIN W/O DYE: CPT

## 2018-02-15 ENCOUNTER — TELEPHONE (OUTPATIENT)
Dept: FAMILY MEDICINE CLINIC | Facility: CLINIC | Age: 28
End: 2018-02-15

## 2018-02-15 NOTE — TELEPHONE ENCOUNTER
----- Message from Larisa Karimi sent at 2/14/2018  4:17 PM EST -----  Contact: [phone]  Very anxious to here from Dr. Martinez about the result of CT scan.  Has called least twice.      Dr. Martinez spoke with pt's mother and gave her the results over the phone.  BBlizbeth, CMA

## 2018-03-19 ENCOUNTER — OFFICE VISIT (OUTPATIENT)
Dept: FAMILY MEDICINE CLINIC | Facility: CLINIC | Age: 28
End: 2018-03-19

## 2018-03-19 VITALS
WEIGHT: 212 LBS | TEMPERATURE: 97.5 F | RESPIRATION RATE: 16 BRPM | BODY MASS INDEX: 26.5 KG/M2 | DIASTOLIC BLOOD PRESSURE: 70 MMHG | SYSTOLIC BLOOD PRESSURE: 100 MMHG | HEART RATE: 64 BPM | OXYGEN SATURATION: 99 %

## 2018-03-19 DIAGNOSIS — L23.9 ECZEMA, ALLERGIC: ICD-10-CM

## 2018-03-19 DIAGNOSIS — F84.0 AUTISM DISORDER: Primary | ICD-10-CM

## 2018-03-19 DIAGNOSIS — K64.0 FIRST DEGREE HEMORRHOIDS: ICD-10-CM

## 2018-03-19 PROCEDURE — 99213 OFFICE O/P EST LOW 20 MIN: CPT | Performed by: FAMILY MEDICINE

## 2018-03-19 RX ORDER — MOMETASONE FUROATE 1 MG/G
CREAM TOPICAL DAILY
Qty: 45 G | Refills: 0 | Status: SHIPPED | OUTPATIENT
Start: 2018-03-19 | End: 2018-08-14 | Stop reason: SDUPTHER

## 2018-03-19 NOTE — PROGRESS NOTES
Subjective   Julius Romero is a 28 y.o. male.     History of Present Illness   Headaches twice a week helped taking two 81 mg aspirin or Tylenol. No nausea.  Continues to shield left eye when watching TV. Gets eye examination annually, no elevated eye pressure. History of autism and eczema. Difficulty with oral communication.   Walks mall each day without difficulty. Does not seem in distress. Walks with feet turned inward and wears the shoes uneven. Spends most of his day sitting doing lessons or puzzles. Remains under watchful eye of mother majority of time.  Seems to sleep well, up to bathroom once. Here with mother.   The following portions of the patient's history were reviewed and updated as appropriate: allergies, current medications, past family history, past medical history, past social history, past surgical history and problem list.    Review of Systems   Constitutional: Negative for appetite change and fatigue.   HENT: Negative for congestion.    Respiratory: Negative.    Gastrointestinal: Negative for abdominal distention.       Objective   Physical Exam   Constitutional: He appears well-developed.   HENT:   Right Ear: External ear normal.   Left Ear: External ear normal.   Mouth/Throat: Oropharynx is clear and moist.   Neck: Neck supple.   Cardiovascular: Normal heart sounds.    Musculoskeletal:        Right hip: Normal.        Left hip: Normal.   Leg lift negative.    Lymphadenopathy:     He has no cervical adenopathy.   Vitals reviewed.      Assessment/Plan   Julius was seen today for follow-up.    Diagnoses and all orders for this visit:    Autism disorder        No change of medication.

## 2018-06-05 ENCOUNTER — OFFICE VISIT (OUTPATIENT)
Dept: FAMILY MEDICINE CLINIC | Facility: CLINIC | Age: 28
End: 2018-06-05

## 2018-06-05 VITALS
TEMPERATURE: 99.9 F | RESPIRATION RATE: 18 BRPM | DIASTOLIC BLOOD PRESSURE: 70 MMHG | BODY MASS INDEX: 26.3 KG/M2 | SYSTOLIC BLOOD PRESSURE: 110 MMHG | WEIGHT: 210.4 LBS | HEART RATE: 87 BPM

## 2018-06-05 DIAGNOSIS — S60.311A ABRASION OF RIGHT THUMB, INITIAL ENCOUNTER: Primary | ICD-10-CM

## 2018-06-05 PROCEDURE — 99213 OFFICE O/P EST LOW 20 MIN: CPT | Performed by: NURSE PRACTITIONER

## 2018-06-05 RX ORDER — CEPHALEXIN 500 MG/1
500 CAPSULE ORAL 3 TIMES DAILY
Qty: 21 CAPSULE | Refills: 0 | Status: SHIPPED | OUTPATIENT
Start: 2018-06-05 | End: 2018-06-12

## 2018-06-05 NOTE — PATIENT INSTRUCTIONS
Abrasion  An abrasion is a cut or scrape on the surface of your skin. An abrasion does not go through all of the layers of your skin. It is important to take good care of your abrasion to prevent infection.  Follow these instructions at home:  Medicines   · Take or apply medicines only as told by your doctor.  · If you were prescribed an antibiotic ointment, finish all of it even if you start to feel better.  Wound care   · Clean the wound with mild soap and water 2-3 times per day or as told by your doctor. Pat your wound dry with a clean towel. Do not rub it.  · There are many ways to close and cover a wound. Follow instructions from your doctor about:  ¨ How to take care of your wound.  ¨ When and how you should change your bandage (dressing).  ¨ When and how you should take off your dressing.  · Check your wound every day for signs of infection. Watch for:  ¨ Redness, swelling, or pain.  ¨ Fluid, blood, or pus.  General instructions   · Keep the dressing dry as told by your doctor. Do not take baths, swim, use a hot tub, or do anything that would put your wound underwater until your doctor says it is okay.  · If there is swelling, raise (elevate) the injured area above the level of your heart while you are sitting or lying down.  · Keep all follow-up visits as told by your doctor. This is important.  Contact a doctor if:  · You were given a tetanus shot and you have any of these where the needle went in:  ¨ Swelling.  ¨ Very bad pain.  ¨ Redness.  ¨ Bleeding.  · Medicine does not help your pain.  · You have any of these at the site of the wound:  ¨ More redness.  ¨ More swelling.  ¨ More pain.  Get help right away if:  · You have a red streak going away from your wound.  · You have a fever.  · You have fluid, blood, or pus coming from your wound.  · There is a bad smell coming from your wound.  This information is not intended to replace advice given to you by your health care provider. Make sure you discuss any  questions you have with your health care provider.  Document Released: 06/05/2009 Document Revised: 05/25/2017 Document Reviewed: 12/16/2015  [employer] Interactive Patient Education © 2017 [employer]

## 2018-06-05 NOTE — PROGRESS NOTES
Subjective   Julius Romero is a 28 y.o. male.   Chief Complaint   Patient presents with   • Finger Injury     accidently stuck finger with a nail in a table      History of Present Illness   Patient was eating in a restaurant and hurt his right thumb on something under the table. No sure if it was from a nail or from a splinter. Site was cleaned with hand  Band-Aid applied. Reported it bled a lot.   He is here with his Mother whom is his care taker. He is autistic.   She reports she is concerned about him receiving any vaccine for Td. She would like us to call  and see if he had a dose there in the past 10 years.   The following portions of the patient's history were reviewed and updated as appropriate: allergies, current medications, past family history, past medical history, past social history, past surgical history and problem list.    Review of Systems   Constitutional: Negative.    Respiratory: Positive for cough.    Musculoskeletal: Negative.    Skin: Positive for wound.        Right thumb abrasion - close to the left side of the nail bed.          Objective   No Known Allergies  Visit Vitals  /70 (BP Location: Right arm, Patient Position: Sitting, Cuff Size: Adult)   Pulse 87   Temp 99.9 °F (37.7 °C) (Temporal Artery )   Resp 18   Wt 95.4 kg (210 lb 6.4 oz)   BMI 26.30 kg/m²         Physical Exam   Constitutional: Vital signs are normal. He appears well-developed and well-nourished. He is cooperative.   HENT:   Right Ear: Hearing, tympanic membrane, external ear and ear canal normal.   Left Ear: Hearing, tympanic membrane, external ear and ear canal normal.   Nose: Nose normal.   Mouth/Throat: Uvula is midline and oropharynx is clear and moist.   Cardiovascular: Normal rate.    Pulmonary/Chest: Effort normal and breath sounds normal.   Musculoskeletal: Normal range of motion.   Neurological: He is alert.   Skin: Skin is warm. Capillary refill takes less than 2 seconds.   Vitals  reviewed.      Assessment/Plan   Julius was seen today for finger injury.    Diagnoses and all orders for this visit:    Abrasion of right thumb, initial encounter  -     cephalexin (KEFLEX) 500 MG capsule; Take 1 capsule by mouth 3 (Three) Times a Day for 7 days.      Parent has decided not to give the Tetnus vaccine.   Site is superficial - site cleaned with alcohol, triple antibiotic ointment applied. Band-aid applied.   Will treat with keflex  - treating empirically.     Patient Instructions   Abrasion  An abrasion is a cut or scrape on the surface of your skin. An abrasion does not go through all of the layers of your skin. It is important to take good care of your abrasion to prevent infection.  Follow these instructions at home:  Medicines   · Take or apply medicines only as told by your doctor.  · If you were prescribed an antibiotic ointment, finish all of it even if you start to feel better.  Wound care   · Clean the wound with mild soap and water 2-3 times per day or as told by your doctor. Pat your wound dry with a clean towel. Do not rub it.  · There are many ways to close and cover a wound. Follow instructions from your doctor about:  ¨ How to take care of your wound.  ¨ When and how you should change your bandage (dressing).  ¨ When and how you should take off your dressing.  · Check your wound every day for signs of infection. Watch for:  ¨ Redness, swelling, or pain.  ¨ Fluid, blood, or pus.  General instructions   · Keep the dressing dry as told by your doctor. Do not take baths, swim, use a hot tub, or do anything that would put your wound underwater until your doctor says it is okay.  · If there is swelling, raise (elevate) the injured area above the level of your heart while you are sitting or lying down.  · Keep all follow-up visits as told by your doctor. This is important.  Contact a doctor if:  · You were given a tetanus shot and you have any of these where the needle went  in:  ¨ Swelling.  ¨ Very bad pain.  ¨ Redness.  ¨ Bleeding.  · Medicine does not help your pain.  · You have any of these at the site of the wound:  ¨ More redness.  ¨ More swelling.  ¨ More pain.  Get help right away if:  · You have a red streak going away from your wound.  · You have a fever.  · You have fluid, blood, or pus coming from your wound.  · There is a bad smell coming from your wound.  This information is not intended to replace advice given to you by your health care provider. Make sure you discuss any questions you have with your health care provider.  Document Released: 06/05/2009 Document Revised: 05/25/2017 Document Reviewed: 12/16/2015  [employer] Interactive Patient Education © 2017 [employer]        Lara Bass, APRN

## 2018-08-14 ENCOUNTER — OFFICE VISIT (OUTPATIENT)
Dept: FAMILY MEDICINE CLINIC | Facility: CLINIC | Age: 28
End: 2018-08-14

## 2018-08-14 VITALS
TEMPERATURE: 98.4 F | DIASTOLIC BLOOD PRESSURE: 72 MMHG | OXYGEN SATURATION: 96 % | RESPIRATION RATE: 16 BRPM | HEIGHT: 75 IN | HEART RATE: 71 BPM | BODY MASS INDEX: 25.99 KG/M2 | WEIGHT: 209 LBS | SYSTOLIC BLOOD PRESSURE: 112 MMHG

## 2018-08-14 DIAGNOSIS — F84.0 AUTISM: Primary | ICD-10-CM

## 2018-08-14 DIAGNOSIS — R36.9 DRAINAGE FROM PENIS: ICD-10-CM

## 2018-08-14 DIAGNOSIS — L29.9 EAR ITCHING: ICD-10-CM

## 2018-08-14 PROCEDURE — 99213 OFFICE O/P EST LOW 20 MIN: CPT | Performed by: FAMILY MEDICINE

## 2018-08-14 PROCEDURE — 69210 REMOVE IMPACTED EAR WAX UNI: CPT | Performed by: FAMILY MEDICINE

## 2018-08-14 NOTE — PROGRESS NOTES
Subjective   Julius Romero is a 28 y.o. male.  Pt is here for ear fullness and possible penile discharge.     History of Present Illness The patient is here with his mother today.  She assists him when he has bowel movements and on one occasion she noticed that he was holding tissue paper in his hand and appeared to be masturbating but she could not tell that for sure, she just noted some white discharge at the head of the penis.  She just wanted to make sure to get this checked to make sure he does not have a penile infection.    The mother also reports that he generally wears headphones and ear buds and that he has seemed more sensitive to his ears lately and she just wanted to get his ear checked.    The following portions of the patient's history were reviewed and updated as appropriate: allergies, current medications, past family history, past medical history, past social history, past surgical history and problem list.    Review of Systems   Constitutional: Negative for chills, fatigue, fever and unexpected weight change.   HENT: Negative for congestion, ear pain, nosebleeds, rhinorrhea, sinus pressure, sneezing, sore throat and trouble swallowing.    Eyes: Negative for itching and visual disturbance.   Respiratory: Negative for cough, chest tightness, shortness of breath and wheezing.    Cardiovascular: Negative for chest pain, palpitations and leg swelling.   Gastrointestinal: Negative for abdominal pain, anal bleeding, blood in stool, constipation, diarrhea, nausea and vomiting.   Endocrine: Negative for cold intolerance, heat intolerance, polydipsia and polyuria.   Genitourinary: Negative for difficulty urinating, frequency, hematuria and urgency.   Musculoskeletal: Negative for back pain, gait problem and myalgias.   Skin: Negative for rash and wound.   Neurological: Negative for dizziness, weakness, numbness and headaches.   Hematological: Negative for adenopathy. Does not bruise/bleed easily.  "  Psychiatric/Behavioral: Negative for agitation, confusion, decreased concentration and suicidal ideas. The patient is not nervous/anxious.        Objective     Vitals:    08/14/18 1547   BP: 112/72   Pulse: 71   Resp: 16   Temp: 98.4 °F (36.9 °C)   TempSrc: Oral   SpO2: 96%   Weight: 94.8 kg (209 lb)   Height: 190.5 cm (75\")         Physical Exam   Constitutional: He appears well-developed and well-nourished.   HENT:   Head: Normocephalic and atraumatic.   Left Ear: External ear normal.   Wax in canal of left ear.   Removed with curette.  Pt gavin well.      Genitourinary: Penis normal.   Skin: No rash noted. No erythema.   Nursing note and vitals reviewed.    Ear Cerumen Removal Instrumentation  Date/Time: 8/14/2018 4:06 PM  Performed by: RENZO SALES.  Authorized by: RENZO SALES.   Consent: Verbal consent obtained.    Anesthesia:  Local Anesthetic: none  Location details: right ear  Patient tolerance: Patient tolerated the procedure well with no immediate complications  Comments: Indication is irritation and itching of ear    Procedure type: curette   Sedation:  Patient sedated: no        Assessment/Plan     Problem List Items Addressed This Visit        Nervous and Auditory    Ear itching    Current Assessment & Plan     Removal lax with a curet from the right canal today.  Patient tolerated well.            Genitourinary    Drainage from penis    Current Assessment & Plan     Normal examination today.  Reassurance provided.  Return to clinic if additional concerns arise.            Other    Autism - Primary          "

## 2018-12-07 DIAGNOSIS — H60.543 DERMATITIS OF BOTH EAR CANALS: ICD-10-CM

## 2018-12-12 RX ORDER — MOMETASONE FUROATE 1 MG/G
OINTMENT TOPICAL
Qty: 15 G | Refills: 3 | Status: SHIPPED | OUTPATIENT
Start: 2018-12-12 | End: 2020-11-13 | Stop reason: SDUPTHER

## 2019-01-29 ENCOUNTER — OFFICE VISIT (OUTPATIENT)
Dept: FAMILY MEDICINE CLINIC | Facility: CLINIC | Age: 29
End: 2019-01-29

## 2019-01-29 VITALS
WEIGHT: 211.8 LBS | BODY MASS INDEX: 26.33 KG/M2 | HEIGHT: 75 IN | HEART RATE: 84 BPM | OXYGEN SATURATION: 98 % | SYSTOLIC BLOOD PRESSURE: 108 MMHG | TEMPERATURE: 98.3 F | DIASTOLIC BLOOD PRESSURE: 70 MMHG

## 2019-01-29 DIAGNOSIS — F84.0 AUTISM: ICD-10-CM

## 2019-01-29 DIAGNOSIS — Z00.00 MEDICARE ANNUAL WELLNESS VISIT, INITIAL: Primary | ICD-10-CM

## 2019-01-29 DIAGNOSIS — E55.9 VITAMIN D DEFICIENCY: ICD-10-CM

## 2019-01-29 DIAGNOSIS — H61.21 IMPACTED CERUMEN OF RIGHT EAR: ICD-10-CM

## 2019-01-29 PROBLEM — H61.20 EXCESS EAR WAX: Status: ACTIVE | Noted: 2019-01-29

## 2019-01-29 LAB
BILIRUB BLD-MCNC: NEGATIVE MG/DL
CLARITY, POC: CLEAR
COLOR UR: YELLOW
EXPIRATION DATE: NORMAL
GLUCOSE UR STRIP-MCNC: NEGATIVE MG/DL
KETONES UR QL: NEGATIVE
LEUKOCYTE EST, POC: NEGATIVE
Lab: NORMAL
NITRITE UR-MCNC: NEGATIVE MG/ML
PH UR: 6.5 [PH] (ref 5–8)
PROT UR STRIP-MCNC: NEGATIVE MG/DL
RBC # UR STRIP: NEGATIVE /UL
SP GR UR: 1.01 (ref 1–1.03)
UROBILINOGEN UR QL: NORMAL

## 2019-01-29 PROCEDURE — 81003 URINALYSIS AUTO W/O SCOPE: CPT | Performed by: FAMILY MEDICINE

## 2019-01-29 PROCEDURE — G0438 PPPS, INITIAL VISIT: HCPCS | Performed by: FAMILY MEDICINE

## 2019-01-29 NOTE — PATIENT INSTRUCTIONS
Medicare Wellness  Personal Prevention Plan of Service     Date of Office Visit:  2019  Encounter Provider:  Lizz Kumar MD  Place of Service:  [facility]  Patient Name: Julius Romero  :  1990    As part of the Medicare Wellness portion of your visit today, we are providing you with this personalized preventive plan of services (PPPS). This plan is based upon recommendations of the [place] Preventive Services Task Force (USPSTF) and the Advisory Committee on Immunization Practices (ACIP).    This lists the preventive care services that should be considered, and provides dates of when you are due. Items listed as completed are up-to-date and do not require any further intervention.    Health Maintenance   Topic Date Due   • MEDICARE ANNUAL WELLNESS  2020   • TDAP/TD VACCINES (2 - Td) 2027   • INFLUENZA VACCINE  Completed       Orders Placed This Encounter   Procedures   • TSH     Standing Status:   Future   • Comprehensive Metabolic Panel     Standing Status:   Future   • Hemoglobin A1c     Standing Status:   Future   • Lipid Panel     Standing Status:   Future   • Vitamin D 25 Hydroxy     Standing Status:   Future   • Vitamin B12     Standing Status:   Future   • POC Urinalysis Dipstick, Automated   • CBC & Differential     Standing Status:   Future     Order Specific Question:   Manual Differential     Answer:   No       No Follow-up on file.      Preventive Care 18-39 Years, Male  Preventive care refers to lifestyle choices and visits with your health care provider that can promote health and wellness.  What does preventive care include?  · A yearly physical exam. This is also called an annual well check.  · Dental exams once or twice a year.  · Routine eye exams. Ask your health care provider how often you should have your eyes checked.  · Personal lifestyle choices, including:  ? Daily care of your teeth and gums.  ? Regular physical  activity.  ? Eating a healthy diet.  ? Avoiding tobacco and drug use.  ? Limiting alcohol use.  ? Practicing safe sex.  What happens during an annual well check?  The services and screenings done by your health care provider during your annual well check will depend on your age, overall health, lifestyle risk factors, and family history of disease.  Counseling  Your health care provider may ask you questions about your:  · Alcohol use.  · Tobacco use.  · Drug use.  · Emotional well-being.  · Home and relationship well-being.  · Sexual activity.  · Eating habits.  · Work and work environment.    Screening  You may have the following tests or measurements:  · Height, weight, and BMI.  · Blood pressure.  · Lipid and cholesterol levels. These may be checked every 5 years starting at age 20.  · Diabetes screening. This is done by checking your blood sugar (glucose) after you have not eaten for a while (fasting).  · Skin check.  · Hepatitis C blood test.  · Hepatitis B blood test.  · Sexually transmitted disease (STD) testing.    Discuss your test results, treatment options, and if necessary, the need for more tests with your health care provider.  Vaccines  Your health care provider may recommend certain vaccines, such as:  · Influenza vaccine. This is recommended every year.  · Tetanus, diphtheria, and acellular pertussis (Tdap, Td) vaccine. You may need a Td booster every 10 years.  · Varicella vaccine. You may need this if you have not been vaccinated.  · HPV vaccine. If you are 26 or younger, you may need three doses over 6 months.  · Measles, mumps, and rubella (MMR) vaccine. You may need at least one dose of MMR. You may also need a second dose.  · Pneumococcal 13-valent conjugate (PCV13) vaccine. You may need this if you have certain conditions and have not been vaccinated.  · Pneumococcal polysaccharide (PPSV23) vaccine. You may need one or two doses if you smoke cigarettes or if you have certain  conditions.  · Meningococcal vaccine. One dose is recommended if you are age 19-21 years and a first-year college student living in a residence zaf[place], or if you have one of several medical conditions. You may also need additional booster doses.  · Hepatitis A vaccine. You may need this if you have certain conditions or if you travel or work in places where you may be exposed to hepatitis A.  · Hepatitis B vaccine. You may need this if you have certain conditions or if you travel or work in places where you may be exposed to hepatitis B.  · Haemophilus influenzae type b (Hib) vaccine. You may need this if you have certain risk factors.    Talk to your health care provider about which screenings and vaccines you need and how often you need them.  This information is not intended to replace advice given to you by your health care provider. Make sure you discuss any questions you have with your health care provider.  Document Released: 02/13/2003 Document Revised: 09/06/2017 Document Reviewed: 10/18/2016  Elsevier Interactive Patient Education © 2018 [employer]

## 2019-01-29 NOTE — PROGRESS NOTES
QUICK REFERENCE INFORMATION:  The ABCs of the Annual Wellness Visit    No previous wellness.  Flu mis in October.  Not fasting today.      Initial Medicare Wellness Visit    HEALTH RISK ASSESSMENT    1990    Recent Hospitalizations:  No hospitalization(s) within the last year..        Current Medical Providers:  Patient Care Team:  Lizz Kumar MD as PCP - General (Family Medicine)  Lizz Kumar MD as PCP - Claims Attributed        Smoking Status:  Social History     Tobacco Use   Smoking Status Never Smoker   Smokeless Tobacco Never Used       Alcohol Consumption:  Social History     Substance and Sexual Activity   Alcohol Use No       Depression Screen:   No flowsheet data found.    Health Habits and Functional and Cognitive Screening:  Functional & Cognitive Status 1/29/2019   Do you have difficulty preparing food and eating? Yes           Does the patient have evidence of cognitive impairment? No, baseline    Asiprin use counseling: Does not need ASA (and currently is not on it)      Recent Lab Results:    Visual Acuity:  No exam data present    Age-appropriate Screening Schedule:  Refer to the list below for future screening recommendations based on patient's age, sex and/or medical conditions. Orders for these recommended tests are listed in the plan section. The patient has been provided with a written plan.    Health Maintenance   Topic Date Due   • TDAP/TD VACCINES (2 - Td) 08/08/2027   • INFLUENZA VACCINE  Completed        Subjective   History of Present Illness    Julius Romero is a 28 y.o. male who presents for an Annual Wellness Visit.    The following portions of the patient's history were reviewed and updated as appropriate: allergies, current medications, past family history, past medical history, past social history, past surgical history and problem list.    Outpatient Medications Prior to Visit   Medication Sig Dispense Refill   • acetaminophen (TYLENOL) 100 MG/ML solution Take  10 mg/kg by mouth Every 4 (Four) Hours As Needed for fever.     • B Complex-C-Folic Acid (SUPER B-COMPLEX/VIT C/FA PO) Take  by mouth.     • cholecalciferol (VITAMIN D3) 1000 UNITS tablet Take 1,000 Units by mouth Daily.     • Cyanocobalamin (B-12) 1000 MCG capsule Take  by mouth.     • docusate sodium (COLACE) 50 MG capsule Take  by mouth 2 (Two) Times a Day As Needed for Constipation.     • Fish Oil oil      • fluticasone (FLONASE) 50 MCG/ACT nasal spray USE TWO SPRAY(S) IN EACH NOSTRIL ONCE DAILY 16 g 6   • hydrocortisone (PROCTOSOL HC) 2.5 % rectal cream Insert  into the rectum 2 (Two) Times a Day. 28.35 g 3   • mometasone (ELOCON) 0.1 % ointment APPLY TOPICALLY TO AFFECTED AREA ONCE DAILY 15 g 3   • Multiple Vitamin (MULTI VITAMIN DAILY PO) Take  by mouth.     • Probiotic Product (PRO-BIOTIC BLEND PO) Take  by mouth.     • psyllium (METAMUCIL) 58.6 % packet Take 1 packet by mouth Daily.     • triamcinolone (KENALOG) 0.1 % cream Apply  topically 2 (Two) Times a Day. 453.6 g 2   • vitamin C (ASCORBIC ACID) 500 MG tablet Take 1,000 mg by mouth Daily.       No facility-administered medications prior to visit.        Patient Active Problem List   Diagnosis   • Eczema   • Vitamin D deficiency   • Autism   • Seasonal allergic rhinitis due to pollen   • First degree hemorrhoids   • Dermatitis of both ear canals   • Snoring   • Daytime somnolence   • Overweight   • Ear itching   • Drainage from penis   • Medicare annual wellness visit, initial   • Excess ear wax       Advance Care Planning:  has NO advance directive - not interested in additional information    Identification of Risk Factors:  Risk factors include: unhealthy diet, cardiovascular risk, inactivity and polypharmacy.    Review of Systems   Constitutional: Negative for chills, fatigue, fever and unexpected weight change.   HENT: Negative for congestion, ear pain, nosebleeds, rhinorrhea, sinus pressure, sneezing, sore throat and trouble swallowing.    Eyes:  Negative for itching and visual disturbance.   Respiratory: Negative for cough, chest tightness, shortness of breath and wheezing.    Cardiovascular: Negative for chest pain, palpitations and leg swelling.   Gastrointestinal: Negative for abdominal pain, anal bleeding, blood in stool, constipation, diarrhea, nausea and vomiting.   Endocrine: Negative for cold intolerance, heat intolerance, polydipsia and polyuria.   Genitourinary: Negative for difficulty urinating, frequency, hematuria and urgency.   Musculoskeletal: Negative for back pain, gait problem and myalgias.   Skin: Negative for rash and wound.   Neurological: Negative for dizziness, weakness, numbness and headaches.   Hematological: Negative for adenopathy. Does not bruise/bleed easily.   Psychiatric/Behavioral: Negative for agitation, confusion, decreased concentration and suicidal ideas. The patient is not nervous/anxious.        Compared to one year ago, the patient feels his physical health is the same.  Compared to one year ago, the patient feels his mental health is the same.    Objective       Physical Exam   Constitutional: He is oriented to person, place, and time. He appears well-developed and well-nourished.   HENT:   Head: Normocephalic and atraumatic.   Left Ear: External ear normal.   Nose: Nose normal.   Mouth/Throat: Oropharynx is clear and moist. No oropharyngeal exudate.   There is dry wax in his right canal obscuring the tympanic membrane.   Eyes: Conjunctivae and EOM are normal. Pupils are equal, round, and reactive to light. Right eye exhibits no discharge. Left eye exhibits no discharge. No scleral icterus.   Neck: Normal range of motion. Neck supple. No JVD present. No tracheal deviation present. No thyromegaly present.   Cardiovascular: Normal rate, regular rhythm and normal heart sounds. Exam reveals no gallop and no friction rub.   No murmur heard.  Pulmonary/Chest: Effort normal and breath sounds normal. No respiratory distress.  "He has no wheezes. He has no rales. He exhibits no tenderness.   Abdominal: Soft. Bowel sounds are normal. He exhibits no distension and no mass. There is no tenderness.   Musculoskeletal: Normal range of motion. He exhibits no edema.   Neurological: He is alert and oriented to person, place, and time. He has normal reflexes. He displays normal reflexes. No cranial nerve deficit. Coordination normal.   Skin: Skin is warm and dry.   Psychiatric: He has a normal mood and affect. His behavior is normal. Judgment and thought content normal.   Nursing note and vitals reviewed.      Vitals:    01/29/19 1307   BP: 108/70   Pulse: 84   Temp: 98.3 °F (36.8 °C)   SpO2: 98%   Weight: 96.1 kg (211 lb 12.8 oz)   Height: 190.5 cm (75\")       Patient's Body mass index is 26.47 kg/m². BMI is within normal parameters. No follow-up required..      Assessment/Plan   Patient Self-Management and Personalized Health Advice  The patient has been provided with information about: diet, exercise, weight management, fall prevention and supplements and preventive services including:   · Diabetes screening, see lab orders, Exercise counseling provided, Fall Risk assessment done.    Visit Diagnoses:    ICD-10-CM ICD-9-CM   1. Medicare annual wellness visit, initial Z00.00 V70.0   2. Autism F84.0 299.00   3. Vitamin D deficiency E55.9 268.9   4. Impacted cerumen of right ear H61.21 380.4       Orders Placed This Encounter   Procedures   • TSH     Standing Status:   Future   • Comprehensive Metabolic Panel     Standing Status:   Future   • Hemoglobin A1c     Standing Status:   Future   • Lipid Panel     Standing Status:   Future   • Vitamin D 25 Hydroxy     Standing Status:   Future   • Vitamin B12     Standing Status:   Future   • POC Urinalysis Dipstick, Automated   • CBC & Differential     Standing Status:   Future     Order Specific Question:   Manual Differential     Answer:   No       Outpatient Encounter Medications as of 1/29/2019 "   Medication Sig Dispense Refill   • acetaminophen (TYLENOL) 100 MG/ML solution Take 10 mg/kg by mouth Every 4 (Four) Hours As Needed for fever.     • B Complex-C-Folic Acid (SUPER B-COMPLEX/VIT C/FA PO) Take  by mouth.     • cholecalciferol (VITAMIN D3) 1000 UNITS tablet Take 1,000 Units by mouth Daily.     • Cyanocobalamin (B-12) 1000 MCG capsule Take  by mouth.     • docusate sodium (COLACE) 50 MG capsule Take  by mouth 2 (Two) Times a Day As Needed for Constipation.     • Fish Oil oil      • fluticasone (FLONASE) 50 MCG/ACT nasal spray USE TWO SPRAY(S) IN EACH NOSTRIL ONCE DAILY 16 g 6   • hydrocortisone (PROCTOSOL HC) 2.5 % rectal cream Insert  into the rectum 2 (Two) Times a Day. 28.35 g 3   • mometasone (ELOCON) 0.1 % ointment APPLY TOPICALLY TO AFFECTED AREA ONCE DAILY 15 g 3   • Multiple Vitamin (MULTI VITAMIN DAILY PO) Take  by mouth.     • Probiotic Product (PRO-BIOTIC BLEND PO) Take  by mouth.     • psyllium (METAMUCIL) 58.6 % packet Take 1 packet by mouth Daily.     • triamcinolone (KENALOG) 0.1 % cream Apply  topically 2 (Two) Times a Day. 453.6 g 2   • vitamin C (ASCORBIC ACID) 500 MG tablet Take 1,000 mg by mouth Daily.       No facility-administered encounter medications on file as of 1/29/2019.        Reviewed use of high risk medication in the elderly: not applicable  Reviewed for potential of harmful drug interactions in the elderly: not applicable    Follow Up:  No Follow-up on file.   6 months or PRN     An After Visit Summary and PPPS with all of these plans were given to the patient.    Ear Cerumen Removal Instrumentation  Date/Time: 1/30/2019 5:20 PM  Performed by: Lizz Kumar MD  Authorized by: Lizz Kumar MD   Consent: Verbal consent obtained.  Consent given by: parent    Anesthesia:  Local Anesthetic: none  Location details: right ear  Patient tolerance: Patient tolerated the procedure well with no immediate complications  Procedure type: irrigation   Sedation:  Patient  sedated: no

## 2019-01-30 PROCEDURE — 69209 REMOVE IMPACTED EAR WAX UNI: CPT | Performed by: FAMILY MEDICINE

## 2019-02-01 ENCOUNTER — LAB (OUTPATIENT)
Dept: FAMILY MEDICINE CLINIC | Facility: CLINIC | Age: 29
End: 2019-02-01

## 2019-02-01 DIAGNOSIS — E55.9 VITAMIN D DEFICIENCY: ICD-10-CM

## 2019-02-01 DIAGNOSIS — Z00.00 MEDICARE ANNUAL WELLNESS VISIT, INITIAL: ICD-10-CM

## 2019-02-01 LAB
25(OH)D3 SERPL-MCNC: 43 NG/ML
ALBUMIN SERPL-MCNC: 4.53 G/DL (ref 3.2–4.8)
ALBUMIN/GLOB SERPL: 2.6 G/DL (ref 1.5–2.5)
ALP SERPL-CCNC: 70 U/L (ref 25–100)
ALT SERPL W P-5'-P-CCNC: 16 U/L (ref 7–40)
ANION GAP SERPL CALCULATED.3IONS-SCNC: 7 MMOL/L (ref 3–11)
ARTICHOKE IGE QN: 112 MG/DL (ref 0–130)
AST SERPL-CCNC: 16 U/L (ref 0–33)
BASOPHILS # BLD AUTO: 0.02 10*3/MM3 (ref 0–0.2)
BASOPHILS NFR BLD AUTO: 0.4 % (ref 0–1)
BILIRUB SERPL-MCNC: 0.8 MG/DL (ref 0.3–1.2)
BUN BLD-MCNC: 18 MG/DL (ref 9–23)
BUN/CREAT SERPL: 19.8 (ref 7–25)
CALCIUM SPEC-SCNC: 9.1 MG/DL (ref 8.7–10.4)
CHLORIDE SERPL-SCNC: 108 MMOL/L (ref 99–109)
CHOLEST SERPL-MCNC: 150 MG/DL (ref 0–200)
CO2 SERPL-SCNC: 26 MMOL/L (ref 20–31)
CREAT BLD-MCNC: 0.91 MG/DL (ref 0.6–1.3)
DEPRECATED RDW RBC AUTO: 41.4 FL (ref 37–54)
EOSINOPHIL # BLD AUTO: 0.2 10*3/MM3 (ref 0–0.3)
EOSINOPHIL NFR BLD AUTO: 3.5 % (ref 0–3)
ERYTHROCYTE [DISTWIDTH] IN BLOOD BY AUTOMATED COUNT: 12.8 % (ref 11.3–14.5)
GFR SERPL CREATININE-BSD FRML MDRD: 101 ML/MIN/1.73
GLOBULIN UR ELPH-MCNC: 1.8 GM/DL
GLUCOSE BLD-MCNC: 78 MG/DL (ref 70–100)
HBA1C MFR BLD: 5.1 % (ref 4.8–5.6)
HCT VFR BLD AUTO: 44.5 % (ref 38.9–50.9)
HDLC SERPL-MCNC: 37 MG/DL (ref 40–60)
HGB BLD-MCNC: 14.5 G/DL (ref 13.1–17.5)
IMM GRANULOCYTES # BLD AUTO: 0.01 10*3/MM3 (ref 0–0.03)
IMM GRANULOCYTES NFR BLD AUTO: 0.2 % (ref 0–0.6)
LYMPHOCYTES # BLD AUTO: 1.86 10*3/MM3 (ref 0.6–4.8)
LYMPHOCYTES NFR BLD AUTO: 33 % (ref 24–44)
MCH RBC QN AUTO: 29.3 PG (ref 27–31)
MCHC RBC AUTO-ENTMCNC: 32.6 G/DL (ref 32–36)
MCV RBC AUTO: 89.9 FL (ref 80–99)
MONOCYTES # BLD AUTO: 0.47 10*3/MM3 (ref 0–1)
MONOCYTES NFR BLD AUTO: 8.3 % (ref 0–12)
NEUTROPHILS # BLD AUTO: 3.09 10*3/MM3 (ref 1.5–8.3)
NEUTROPHILS NFR BLD AUTO: 54.8 % (ref 41–71)
PLATELET # BLD AUTO: 203 10*3/MM3 (ref 150–450)
PMV BLD AUTO: 12.2 FL (ref 6–12)
POTASSIUM BLD-SCNC: 4.5 MMOL/L (ref 3.5–5.5)
PROT SERPL-MCNC: 6.3 G/DL (ref 5.7–8.2)
RBC # BLD AUTO: 4.95 10*6/MM3 (ref 4.2–5.76)
SODIUM BLD-SCNC: 141 MMOL/L (ref 132–146)
TRIGL SERPL-MCNC: 58 MG/DL (ref 0–150)
TSH SERPL DL<=0.05 MIU/L-ACNC: 1.34 MIU/ML (ref 0.35–5.35)
VIT B12 BLD-MCNC: 1262 PG/ML (ref 211–911)
WBC NRBC COR # BLD: 5.64 10*3/MM3 (ref 3.5–10.8)

## 2019-02-01 PROCEDURE — 83036 HEMOGLOBIN GLYCOSYLATED A1C: CPT | Performed by: FAMILY MEDICINE

## 2019-02-01 PROCEDURE — 82607 VITAMIN B-12: CPT | Performed by: FAMILY MEDICINE

## 2019-02-01 PROCEDURE — 84443 ASSAY THYROID STIM HORMONE: CPT | Performed by: FAMILY MEDICINE

## 2019-02-01 PROCEDURE — 82306 VITAMIN D 25 HYDROXY: CPT | Performed by: FAMILY MEDICINE

## 2019-02-01 PROCEDURE — 85025 COMPLETE CBC W/AUTO DIFF WBC: CPT | Performed by: FAMILY MEDICINE

## 2019-02-01 PROCEDURE — 80053 COMPREHEN METABOLIC PANEL: CPT | Performed by: FAMILY MEDICINE

## 2019-02-01 PROCEDURE — 80061 LIPID PANEL: CPT | Performed by: FAMILY MEDICINE

## 2019-02-01 PROCEDURE — 36415 COLL VENOUS BLD VENIPUNCTURE: CPT | Performed by: FAMILY MEDICINE

## 2019-02-28 ENCOUNTER — OFFICE VISIT (OUTPATIENT)
Dept: FAMILY MEDICINE CLINIC | Facility: CLINIC | Age: 29
End: 2019-02-28

## 2019-02-28 VITALS
HEIGHT: 75 IN | OXYGEN SATURATION: 98 % | DIASTOLIC BLOOD PRESSURE: 82 MMHG | SYSTOLIC BLOOD PRESSURE: 128 MMHG | RESPIRATION RATE: 18 BRPM | WEIGHT: 210 LBS | HEART RATE: 96 BPM | BODY MASS INDEX: 26.11 KG/M2 | TEMPERATURE: 98.3 F

## 2019-02-28 DIAGNOSIS — H10.31 ACUTE BACTERIAL CONJUNCTIVITIS OF RIGHT EYE: ICD-10-CM

## 2019-02-28 DIAGNOSIS — J01.00 ACUTE NON-RECURRENT MAXILLARY SINUSITIS: ICD-10-CM

## 2019-02-28 DIAGNOSIS — H66.003 ACUTE SUPPURATIVE OTITIS MEDIA OF BOTH EARS WITHOUT SPONTANEOUS RUPTURE OF TYMPANIC MEMBRANES, RECURRENCE NOT SPECIFIED: Primary | ICD-10-CM

## 2019-02-28 DIAGNOSIS — J02.9 SORE THROAT: ICD-10-CM

## 2019-02-28 LAB
EXPIRATION DATE: NORMAL
EXPIRATION DATE: NORMAL
FLUAV AG NPH QL: NEGATIVE
FLUBV AG NPH QL: NEGATIVE
INTERNAL CONTROL: NORMAL
INTERNAL CONTROL: NORMAL
Lab: NORMAL
Lab: NORMAL
S PYO AG THROAT QL: NEGATIVE

## 2019-02-28 PROCEDURE — 87804 INFLUENZA ASSAY W/OPTIC: CPT | Performed by: NURSE PRACTITIONER

## 2019-02-28 PROCEDURE — 99213 OFFICE O/P EST LOW 20 MIN: CPT | Performed by: NURSE PRACTITIONER

## 2019-02-28 PROCEDURE — 87880 STREP A ASSAY W/OPTIC: CPT | Performed by: NURSE PRACTITIONER

## 2019-02-28 RX ORDER — AMOXICILLIN AND CLAVULANATE POTASSIUM 875; 125 MG/1; MG/1
1 TABLET, FILM COATED ORAL EVERY 12 HOURS SCHEDULED
Qty: 20 TABLET | Refills: 0 | Status: SHIPPED | OUTPATIENT
Start: 2019-02-28 | End: 2020-02-04

## 2019-02-28 RX ORDER — SULFACETAMIDE SODIUM 100 MG/ML
1 SOLUTION/ DROPS OPHTHALMIC 4 TIMES DAILY
Qty: 10 ML | Refills: 0 | Status: SHIPPED | OUTPATIENT
Start: 2019-02-28

## 2019-02-28 NOTE — PROGRESS NOTES
Subjective   Julius Romero is a 28 y.o. male.     History of Present Illness 2 days of sore throat, red throat, post nasal drainage, nasal congestion, occasional cough. Also noticed right eye edema and discharge this am. Fever was 100.5. Treated with Tylenol. He had a flu vaccine in the fall. Has been out shopping but no other sick contacts.  No wheezing. Good appetite.    The following portions of the patient's history were reviewed and updated as appropriate: allergies, current medications, past family history, past medical history, past social history, past surgical history and problem list.    Review of Systems   Constitutional: Positive for fever. Negative for appetite change and unexpected weight loss.   HENT: Positive for congestion, postnasal drip, rhinorrhea and sore throat. Negative for nosebleeds and trouble swallowing.    Eyes: Positive for pain, discharge and redness. Negative for visual disturbance.   Respiratory: Positive for cough. Negative for shortness of breath and wheezing.    Cardiovascular: Negative for chest pain, palpitations and leg swelling.   Gastrointestinal: Negative for abdominal pain, blood in stool, constipation, diarrhea, nausea and vomiting.   Endocrine: Negative for polydipsia, polyphagia and polyuria.   Genitourinary: Negative for urinary incontinence, dysuria, frequency and hematuria.   Musculoskeletal: Negative for arthralgias, gait problem, joint swelling and myalgias.   Skin: Negative for rash.   Neurological: Negative for dizziness, seizures, syncope and numbness.   Hematological: Negative for adenopathy. Does not bruise/bleed easily.   Psychiatric/Behavioral: Negative for sleep disturbance and depressed mood. The patient is not nervous/anxious.        Objective   Physical Exam   Constitutional: He is oriented to person, place, and time. He appears well-developed and well-nourished. No distress.   HENT:   Head: Normocephalic and atraumatic.   Right Ear: External ear  normal. Tympanic membrane is erythematous.   Left Ear: External ear normal. Tympanic membrane is erythematous.   Nose: Nose normal.   Mouth/Throat: Oropharynx is clear and moist. No oropharyngeal exudate.   Eyes: Pupils are equal, round, and reactive to light. Right eye exhibits discharge and exudate. Left eye exhibits no discharge. Right conjunctiva is injected. No scleral icterus.   Neck: Neck supple. No tracheal deviation present. No thyromegaly present.   Cardiovascular: Normal rate, regular rhythm and normal heart sounds. Exam reveals no gallop and no friction rub.   No murmur heard.  Pulmonary/Chest: Effort normal and breath sounds normal. No respiratory distress. He has no wheezes.   Abdominal: Soft. Bowel sounds are normal. He exhibits no distension and no mass. There is no tenderness.   Musculoskeletal: He exhibits no edema or deformity.   Lymphadenopathy:     He has no cervical adenopathy.   Neurological: He is alert and oriented to person, place, and time. Coordination normal.   Skin: Skin is warm and dry. Capillary refill takes less than 2 seconds. No rash noted. No erythema.   Psychiatric: He has a normal mood and affect. His speech is normal and behavior is normal. Judgment and thought content normal.   Nursing note and vitals reviewed.        Assessment/Plan   Julius was seen today for sore throat and conjunctivitis.    Diagnoses and all orders for this visit:    Acute suppurative otitis media of both ears without spontaneous rupture of tympanic membranes, recurrence not specified  -     amoxicillin-clavulanate (AUGMENTIN) 875-125 MG per tablet; Take 1 tablet by mouth Every 12 (Twelve) Hours.    Sore throat  -     POC Influenza A / B  -     POC Rapid Strep A    Acute non-recurrent maxillary sinusitis  -     amoxicillin-clavulanate (AUGMENTIN) 875-125 MG per tablet; Take 1 tablet by mouth Every 12 (Twelve) Hours.    Acute bacterial conjunctivitis of right eye  -     sulfacetamide (BLEPH-10) 10 %  ophthalmic solution; Administer 1 drop to the right eye 4 (Four) Times a Day.      Flu-  RSS-  Symptomatic treatment with warm compress.  Use drops for 5 days.  Finish all of antibiotic  Follow up symptoms persist or worsen or go to ER.

## 2019-04-01 DIAGNOSIS — J30.1 SEASONAL ALLERGIC RHINITIS DUE TO POLLEN: ICD-10-CM

## 2019-04-01 RX ORDER — FLUTICASONE PROPIONATE 50 MCG
SPRAY, SUSPENSION (ML) NASAL
Qty: 16 G | Refills: 6 | Status: SHIPPED | OUTPATIENT
Start: 2019-04-01 | End: 2020-11-13 | Stop reason: SDUPTHER

## 2020-02-04 ENCOUNTER — OFFICE VISIT (OUTPATIENT)
Dept: FAMILY MEDICINE CLINIC | Facility: CLINIC | Age: 30
End: 2020-02-04

## 2020-02-04 VITALS
DIASTOLIC BLOOD PRESSURE: 76 MMHG | SYSTOLIC BLOOD PRESSURE: 108 MMHG | HEIGHT: 75 IN | OXYGEN SATURATION: 99 % | TEMPERATURE: 98.4 F | WEIGHT: 203 LBS | BODY MASS INDEX: 25.24 KG/M2 | HEART RATE: 69 BPM

## 2020-02-04 DIAGNOSIS — E67.3 HYPERVITAMINOSIS D: ICD-10-CM

## 2020-02-04 DIAGNOSIS — Z00.00 MEDICARE ANNUAL WELLNESS VISIT, SUBSEQUENT: Primary | ICD-10-CM

## 2020-02-04 DIAGNOSIS — E66.3 OVERWEIGHT: ICD-10-CM

## 2020-02-04 LAB
ALBUMIN SERPL-MCNC: 4.8 G/DL (ref 3.5–5.2)
ALBUMIN/GLOB SERPL: 2 G/DL
ALP SERPL-CCNC: 70 U/L (ref 39–117)
ALT SERPL W P-5'-P-CCNC: 11 U/L (ref 1–41)
ANION GAP SERPL CALCULATED.3IONS-SCNC: 12 MMOL/L (ref 5–15)
AST SERPL-CCNC: 11 U/L (ref 1–40)
BASOPHILS # BLD AUTO: 0.05 10*3/MM3 (ref 0–0.2)
BASOPHILS NFR BLD AUTO: 0.8 % (ref 0–1.5)
BILIRUB SERPL-MCNC: 0.5 MG/DL (ref 0.2–1.2)
BUN BLD-MCNC: 14 MG/DL (ref 6–20)
BUN/CREAT SERPL: 14.6 (ref 7–25)
CALCIUM SPEC-SCNC: 9.3 MG/DL (ref 8.6–10.5)
CHLORIDE SERPL-SCNC: 102 MMOL/L (ref 98–107)
CHOLEST SERPL-MCNC: 141 MG/DL (ref 0–200)
CO2 SERPL-SCNC: 27 MMOL/L (ref 22–29)
CREAT BLD-MCNC: 0.96 MG/DL (ref 0.76–1.27)
DEPRECATED RDW RBC AUTO: 40.6 FL (ref 37–54)
EOSINOPHIL # BLD AUTO: 0.23 10*3/MM3 (ref 0–0.4)
EOSINOPHIL NFR BLD AUTO: 3.7 % (ref 0.3–6.2)
ERYTHROCYTE [DISTWIDTH] IN BLOOD BY AUTOMATED COUNT: 12.7 % (ref 12.3–15.4)
GFR SERPL CREATININE-BSD FRML MDRD: 94 ML/MIN/1.73
GLOBULIN UR ELPH-MCNC: 2.4 GM/DL
GLUCOSE BLD-MCNC: 80 MG/DL (ref 65–99)
HBA1C MFR BLD: 5.2 % (ref 4.8–5.6)
HCT VFR BLD AUTO: 44.1 % (ref 37.5–51)
HDLC SERPL-MCNC: 41 MG/DL (ref 40–60)
HGB BLD-MCNC: 14.9 G/DL (ref 13–17.7)
IMM GRANULOCYTES # BLD AUTO: 0.04 10*3/MM3 (ref 0–0.05)
IMM GRANULOCYTES NFR BLD AUTO: 0.7 % (ref 0–0.5)
LDLC SERPL CALC-MCNC: 88 MG/DL (ref 0–100)
LDLC/HDLC SERPL: 2.14 {RATIO}
LYMPHOCYTES # BLD AUTO: 1.97 10*3/MM3 (ref 0.7–3.1)
LYMPHOCYTES NFR BLD AUTO: 32 % (ref 19.6–45.3)
MCH RBC QN AUTO: 29.6 PG (ref 26.6–33)
MCHC RBC AUTO-ENTMCNC: 33.8 G/DL (ref 31.5–35.7)
MCV RBC AUTO: 87.7 FL (ref 79–97)
MONOCYTES # BLD AUTO: 0.47 10*3/MM3 (ref 0.1–0.9)
MONOCYTES NFR BLD AUTO: 7.6 % (ref 5–12)
NEUTROPHILS # BLD AUTO: 3.39 10*3/MM3 (ref 1.7–7)
NEUTROPHILS NFR BLD AUTO: 55.2 % (ref 42.7–76)
NRBC BLD AUTO-RTO: 0 /100 WBC (ref 0–0.2)
PLATELET # BLD AUTO: 216 10*3/MM3 (ref 140–450)
PMV BLD AUTO: 11.5 FL (ref 6–12)
POTASSIUM BLD-SCNC: 4.3 MMOL/L (ref 3.5–5.2)
PROT SERPL-MCNC: 7.2 G/DL (ref 6–8.5)
RBC # BLD AUTO: 5.03 10*6/MM3 (ref 4.14–5.8)
SODIUM BLD-SCNC: 141 MMOL/L (ref 136–145)
TRIGL SERPL-MCNC: 61 MG/DL (ref 0–150)
TSH SERPL DL<=0.05 MIU/L-ACNC: 2.04 UIU/ML (ref 0.27–4.2)
VLDLC SERPL-MCNC: 12.2 MG/DL (ref 5–40)
WBC NRBC COR # BLD: 6.15 10*3/MM3 (ref 3.4–10.8)

## 2020-02-04 PROCEDURE — 83036 HEMOGLOBIN GLYCOSYLATED A1C: CPT | Performed by: FAMILY MEDICINE

## 2020-02-04 PROCEDURE — G0439 PPPS, SUBSEQ VISIT: HCPCS | Performed by: FAMILY MEDICINE

## 2020-02-04 PROCEDURE — 85025 COMPLETE CBC W/AUTO DIFF WBC: CPT | Performed by: FAMILY MEDICINE

## 2020-02-04 PROCEDURE — 80053 COMPREHEN METABOLIC PANEL: CPT | Performed by: FAMILY MEDICINE

## 2020-02-04 PROCEDURE — 84443 ASSAY THYROID STIM HORMONE: CPT | Performed by: FAMILY MEDICINE

## 2020-02-04 PROCEDURE — 80061 LIPID PANEL: CPT | Performed by: FAMILY MEDICINE

## 2020-02-04 PROCEDURE — 36415 COLL VENOUS BLD VENIPUNCTURE: CPT | Performed by: FAMILY MEDICINE

## 2020-02-04 NOTE — PROGRESS NOTES
The ABCs of the Annual Wellness Visit  Subsequent Medicare Wellness Visit    No chief complaint on file.    Last wellness January 29th 2019  Pt is fasting today.   Pt had first part of hep a vaccine last of Nov.      Subjective   History of Present Illness:  Julius Romero is a 29 y.o. male who presents for a Subsequent Medicare Wellness Visit.    HEALTH RISK ASSESSMENT    Recent Hospitalizations:  No hospitalization(s) within the last year.    Current Medical Providers:  Patient Care Team:  Lizz Kumar MD as PCP - General (Family Medicine)  Lizz Kumar MD as PCP - Claims Attributed    Smoking Status:  Social History     Tobacco Use   Smoking Status Never Smoker   Smokeless Tobacco Never Used       Alcohol Consumption:  Social History     Substance and Sexual Activity   Alcohol Use No       Depression Screen:   No flowsheet data found.    Fall Risk Screen:  DARSHANADI Fall Risk Assessment has not been completed.    Health Habits and Functional and Cognitive Screening:  Functional & Cognitive Status 1/29/2019   Do you have difficulty preparing food and eating? Yes         Does the patient have evidence of cognitive impairment? Yes    Asprin use counseling:Does not need ASA (and currently is not on it)    Age-appropriate Screening Schedule:  Refer to the list below for future screening recommendations based on patient's age, sex and/or medical conditions. Orders for these recommended tests are listed in the plan section. The patient has been provided with a written plan.    Health Maintenance   Topic Date Due   • TDAP/TD VACCINES (2 - Td) 08/08/2027   • INFLUENZA VACCINE  Completed          The following portions of the patient's history were reviewed and updated as appropriate: allergies, current medications, past family history, past medical history, past social history, past surgical history and problem list.    Outpatient Medications Prior to Visit   Medication Sig Dispense Refill   • acetaminophen  (TYLENOL) 100 MG/ML solution Take 10 mg/kg by mouth Every 4 (Four) Hours As Needed for fever.     • cholecalciferol (VITAMIN D3) 1000 UNITS tablet Take 2,000 Units by mouth Daily.     • docusate sodium (COLACE) 50 MG capsule Take  by mouth 2 (Two) Times a Day As Needed for Constipation.     • Fish Oil oil      • fluticasone (FLONASE) 50 MCG/ACT nasal spray USE 2 SPRAY(S) IN EACH NOSTRIL ONCE DAILY 16 g 6   • Gelatin 600 MG capsule Take  by mouth.     • hydrocortisone (PROCTOSOL HC) 2.5 % rectal cream Insert  into the rectum 2 (Two) Times a Day. 28.35 g 3   • mometasone (ELOCON) 0.1 % ointment APPLY TOPICALLY TO AFFECTED AREA ONCE DAILY 15 g 3   • Multiple Vitamin (MULTI VITAMIN DAILY PO) Take  by mouth.     • Probiotic Product (PRO-BIOTIC BLEND PO) Take  by mouth.     • psyllium (METAMUCIL) 58.6 % packet Take 1 packet by mouth Daily.     • vitamin C (ASCORBIC ACID) 500 MG tablet Take 1,000 mg by mouth Daily.     • sulfacetamide (BLEPH-10) 10 % ophthalmic solution Administer 1 drop to the right eye 4 (Four) Times a Day. 10 mL 0   • triamcinolone (KENALOG) 0.1 % cream Apply  topically 2 (Two) Times a Day. 453.6 g 2   • amoxicillin-clavulanate (AUGMENTIN) 875-125 MG per tablet Take 1 tablet by mouth Every 12 (Twelve) Hours. 20 tablet 0   • B Complex-C-Folic Acid (SUPER B-COMPLEX/VIT C/FA PO) Take  by mouth.     • Cyanocobalamin (B-12) 1000 MCG capsule Take  by mouth.       No facility-administered medications prior to visit.        Patient Active Problem List   Diagnosis   • Eczema   • Vitamin D deficiency   • Autism   • Seasonal allergic rhinitis due to pollen   • First degree hemorrhoids   • Dermatitis of both ear canals   • Snoring   • Daytime somnolence   • Overweight   • Ear itching   • Drainage from penis   • Medicare annual wellness visit, initial   • Excess ear wax   • Medicare annual wellness visit, subsequent       Advanced Care Planning:  ACP discussion was declined by the patient.    Review of Systems  "  Constitutional: Negative for chills, fatigue, fever and unexpected weight change.   HENT: Negative for congestion, ear pain, nosebleeds, rhinorrhea, sinus pressure, sneezing, sore throat and trouble swallowing.    Eyes: Negative for itching and visual disturbance.   Respiratory: Negative for cough, chest tightness, shortness of breath and wheezing.    Cardiovascular: Negative for chest pain, palpitations and leg swelling.   Gastrointestinal: Negative for abdominal pain, anal bleeding, blood in stool, constipation, diarrhea, nausea and vomiting.   Endocrine: Negative for cold intolerance, heat intolerance, polydipsia and polyuria.   Genitourinary: Negative for difficulty urinating, frequency, hematuria and urgency.   Musculoskeletal: Negative for back pain, gait problem and myalgias.   Skin: Negative for rash and wound.   Neurological: Negative for dizziness, weakness, numbness and headaches.   Hematological: Negative for adenopathy. Does not bruise/bleed easily.   Psychiatric/Behavioral: Negative for agitation, confusion, decreased concentration and suicidal ideas. The patient is not nervous/anxious.        Compared to one year ago, the patient feels his physical health is the same.  Compared to one year ago, the patient feels his mental health is the same.    Reviewed chart for potential of high risk medication in the elderly: yes  Reviewed chart for potential of harmful drug interactions in the elderly:yes    Objective         Vitals:    02/04/20 0844   BP: 108/76   Pulse: 69   Temp: 98.4 °F (36.9 °C)   SpO2: 99%   Weight: 92.1 kg (203 lb)   Height: 190.5 cm (75\")       Body mass index is 25.37 kg/m².  Discussed the patient's BMI with him. The BMI is in the acceptable range.    Physical Exam   Constitutional: He is oriented to person, place, and time. He appears well-developed and well-nourished.   HENT:   Head: Normocephalic and atraumatic.   Right Ear: External ear normal.   Left Ear: External ear normal. "   Nose: Nose normal.   Mouth/Throat: Oropharynx is clear and moist. No oropharyngeal exudate.   Eyes: Pupils are equal, round, and reactive to light. Conjunctivae and EOM are normal. Right eye exhibits no discharge. Left eye exhibits no discharge. No scleral icterus.   Neck: Normal range of motion. Neck supple. No JVD present. No tracheal deviation present. No thyromegaly present.   Cardiovascular: Normal rate, regular rhythm and normal heart sounds. Exam reveals no gallop and no friction rub.   No murmur heard.  Pulmonary/Chest: Effort normal and breath sounds normal. No respiratory distress. He has no wheezes. He has no rales. He exhibits no tenderness.   Abdominal: Soft. Bowel sounds are normal. He exhibits no distension and no mass. There is no tenderness.   Musculoskeletal: Normal range of motion. He exhibits no edema.   Neurological: He is alert and oriented to person, place, and time. He has normal reflexes. He displays normal reflexes. No cranial nerve deficit. Coordination normal.   Skin: Skin is warm and dry.   Psychiatric: He has a normal mood and affect. Judgment normal.   Nursing note and vitals reviewed.            Assessment/Plan   Medicare Risks and Personalized Health Plan  CMS Preventative Services Quick Reference  Immunizations Discussed/Encouraged (specific immunizations; Hepatitis A Vaccine/Series )    The above risks/problems have been discussed with the patient.  Pertinent information has been shared with the patient in the After Visit Summary.  Follow up plans and orders are seen below in the Assessment/Plan Section.    Diagnoses and all orders for this visit:    1. Medicare annual wellness visit, subsequent (Primary)  -     CBC & Differential  -     TSH  -     Comprehensive Metabolic Panel  -     Hemoglobin A1c  -     Lipid Panel  -     CBC Auto Differential  -     Vitamin D 25 Hydroxy    2. Overweight    3. Hypervitaminosis D   -     Vitamin D 25 Hydroxy      Follow Up:  Return in about 1  year (around 2/4/2021).     An After Visit Summary and PPPS were given to the patient.     Mother declines second hep a shot today.

## 2020-02-04 NOTE — PATIENT INSTRUCTIONS
Medicare Wellness  Personal Prevention Plan of Service     Date of Office Visit:  2020  Encounter Provider:  Lizz Kumar MD  Place of Service:  [facility]  Patient Name: Julius Romero  :  1990    As part of the Medicare Wellness portion of your visit today, we are providing you with this personalized preventive plan of services (PPPS). This plan is based upon recommendations of the [place] Preventive Services Task Force (USPSTF) and the Advisory Committee on Immunization Practices (ACIP).    This lists the preventive care services that should be considered, and provides dates of when you are due. Items listed as completed are up-to-date and do not require any further intervention.    Health Maintenance   Topic Date Due   • MEDICARE ANNUAL WELLNESS  2021   • TDAP/TD VACCINES (2 - Td) 2027   • INFLUENZA VACCINE  Completed       Orders Placed This Encounter   Procedures   • TSH   • Comprehensive Metabolic Panel   • Hemoglobin A1c   • Lipid Panel   • CBC Auto Differential   • Vitamin D 25 Hydroxy   • CBC & Differential     Order Specific Question:   Manual Differential     Answer:   No       No follow-ups on file.

## 2020-05-12 DIAGNOSIS — J30.1 SEASONAL ALLERGIC RHINITIS DUE TO POLLEN: ICD-10-CM

## 2020-05-12 RX ORDER — FLUTICASONE PROPIONATE 50 MCG
SPRAY, SUSPENSION (ML) NASAL
Qty: 16 G | Refills: 0 | OUTPATIENT
Start: 2020-05-12

## 2020-05-21 ENCOUNTER — TELEPHONE (OUTPATIENT)
Dept: FAMILY MEDICINE CLINIC | Facility: CLINIC | Age: 30
End: 2020-05-21

## 2020-05-21 NOTE — TELEPHONE ENCOUNTER
I called the patients mom (Adelaide) and informed her the script was  and I renewed the script for her. She gave a verbal understanding.

## 2020-05-21 NOTE — TELEPHONE ENCOUNTER
PT'S MOTHER CALLED ASKING WHY THE PT'S PRESCRIPTION FOR  fluticasone (FLONASE) 50 MCG/ACT nasal spray WAS DENIED.    [facility] CONTACT [phone]     PLEASE ADVISE

## 2020-11-13 DIAGNOSIS — J30.1 SEASONAL ALLERGIC RHINITIS DUE TO POLLEN: ICD-10-CM

## 2020-11-13 DIAGNOSIS — H60.543 DERMATITIS OF BOTH EAR CANALS: ICD-10-CM

## 2020-11-13 RX ORDER — MOMETASONE FUROATE 1 MG/G
OINTMENT TOPICAL
Qty: 15 G | Refills: 0 | Status: SHIPPED | OUTPATIENT
Start: 2020-11-13

## 2020-11-13 RX ORDER — FLUTICASONE PROPIONATE 50 MCG
2 SPRAY, SUSPENSION (ML) NASAL DAILY
Qty: 16 G | Refills: 0 | Status: SHIPPED | OUTPATIENT
Start: 2020-11-13

## 2020-11-13 NOTE — TELEPHONE ENCOUNTER
"Patient's mother Adelaide requested refills of:  fluticasone (FLONASE) 50 MCG/ACT nasal spray,  hydrocortisone (PROCTOSOL HC) 2.5 % rectal cream, and  mometasone (ELOCON) 0.1 % ointment    Please call and advise. Adelaide's call back [phone]    [address] - [phone]  - [phone] FX      ** When trying to request a refill of hydrocortisone (PROCTOSOL HC) 2.5 % rectal cream this message pops up: \"hydrocortisone (ANUSOL-HC) 2.5 % rectal cream cannot be ordered as a prescription\"  "

## 2020-12-30 ENCOUNTER — TELEPHONE (OUTPATIENT)
Dept: FAMILY MEDICINE CLINIC | Facility: CLINIC | Age: 30
End: 2020-12-30

## 2020-12-30 NOTE — TELEPHONE ENCOUNTER
----- Message from Shanique Cline sent at 12/28/2020  1:39 PM EST -----  Regarding: PLEASE CALL  Contact: [phone]  PT MOTHER CALLING ABOUT A BILL SHE RECEIVED FROM [employer] 2/4/20. PT WAS IN FOR A ANNUAL WELLNESS VISIT AND HAS BOTH MEDICAID AND MEDICARE.

## 2020-12-30 NOTE — TELEPHONE ENCOUNTER
Patients mother advised per billing there was an issue with his Medicaid. Advised they are resubmitting this claim to try and get this fixed. Advised it could take up to 2 weeks and someone with billing will call her back to let her know the result of this.

## 2021-01-25 ENCOUNTER — TELEPHONE (OUTPATIENT)
Dept: FAMILY MEDICINE CLINIC | Facility: CLINIC | Age: 31
End: 2021-01-25

## 2021-01-25 NOTE — TELEPHONE ENCOUNTER
Patients mother called upset regarding a bill from 02/04/2020.   I have contacted the billing dept again to request information. They are advising me none of his labs are being covered.   Initial bill was 629.20, insurance paid 10.35. Our billing dept added an uninsured credit of 436.90 which leaves 95.90 for the patient.     I also attempted calling medicare but they were unable to give me any information under member services b/c I was calling from provider office. Provider services would not provide this information either. I advised patient to contact member services to see exactly what labs are approved under their plan.     She ended the call saying they would potentially be moving care due to loss of confidence that they wouldn't get a bill in the upcoming year. I apologized for the billing issues we are having.